# Patient Record
Sex: FEMALE | URBAN - METROPOLITAN AREA
[De-identification: names, ages, dates, MRNs, and addresses within clinical notes are randomized per-mention and may not be internally consistent; named-entity substitution may affect disease eponyms.]

---

## 2022-02-07 ENCOUNTER — TELEPHONE (OUTPATIENT)
Dept: OBGYN CLINIC | Facility: HOSPITAL | Age: 32
End: 2022-02-07

## 2023-12-22 ENCOUNTER — ANNUAL EXAM (OUTPATIENT)
Dept: OBGYN CLINIC | Facility: CLINIC | Age: 33
End: 2023-12-22
Payer: COMMERCIAL

## 2023-12-22 VITALS
SYSTOLIC BLOOD PRESSURE: 116 MMHG | DIASTOLIC BLOOD PRESSURE: 74 MMHG | BODY MASS INDEX: 31.83 KG/M2 | HEIGHT: 62 IN | WEIGHT: 173 LBS

## 2023-12-22 DIAGNOSIS — Z01.419 ENCOUNTER FOR GYNECOLOGICAL EXAMINATION WITHOUT ABNORMAL FINDING: Primary | ICD-10-CM

## 2023-12-22 DIAGNOSIS — Z31.69 PRE-CONCEPTION COUNSELING: ICD-10-CM

## 2023-12-22 PROCEDURE — S0610 ANNUAL GYNECOLOGICAL EXAMINA: HCPCS | Performed by: PHYSICIAN ASSISTANT

## 2023-12-22 RX ORDER — VENLAFAXINE 75 MG/1
TABLET ORAL
COMMUNITY
Start: 2023-12-21

## 2023-12-22 NOTE — PROGRESS NOTES
Assessment/Plan:    No problem-specific Assessment & Plan notes found for this encounter.       Diagnoses and all orders for this visit:    Encounter for gynecological examination without abnormal finding  -     IGP, Aptima HPV, Rfx 16/18,45    Pre-conception counseling  -     Ambulatory Referral to Maternal Fetal Medicine; Future    Other orders  -     Risankizumab-rzaa (SKYRIZI, 150 MG DOSE, SC)  -     venlafaxine (EFFEXOR) 75 mg tablet        Pap done.  Recommended pre-conception counseling with MFM in light of autoimmune disease and Skyrizi use; patient amenable.  Referral entered.  If no problems, patient to return in 1 year for routine gyn care.    Subjective:      Patient ID: Nicolle Feng is a 33 y.o. female.    Patient is a G0 here for yearly gyn exam.  She is new to our office today.  It has been several years since her last gyn visit.  States she is doing well overall. Periods are regular every 26-30 days, and bleeding lasts for 5-7 days.  She denies heavy bleeding, severe cramping, HA, and mood symptoms.  Patient is sexually active with her .  They would like to try to conceive in 2024.  She is currently on Skyrizi for psoriatic arthritis treatment and followed by a rheumatologist.  Also has thyroid levels followed by her PCP as her TSH has been borderline normal/high.  Denies bowel/bladder changes, pelvic pain, bloating, abdominal pain, n/v, and change in appetite.  No history of abnormal Paps or HPV.    Patient is performing self-breast exam.  Denies new masses, skin changes, nipple discharge, and pain/tenderness.  Family history of breast cancer in a MGM at age 65.        The following portions of the patient's history were reviewed and updated as appropriate: allergies, current medications, past family history, past medical history, past social history, past surgical history, and problem list.    Review of Systems   Constitutional:  Negative for appetite change and unexpected weight change.  "  Cardiovascular:         No masses, skin changes, nipple discharge, and pain/tenderness.   Gastrointestinal:  Negative for abdominal distention, abdominal pain, constipation, diarrhea, nausea and vomiting.   Genitourinary:  Negative for difficulty urinating, dysuria, frequency, genital sores, hematuria, menstrual problem, pelvic pain, urgency, vaginal bleeding, vaginal discharge and vaginal pain.         Objective:      /74 (BP Location: Left arm, Patient Position: Sitting, Cuff Size: Adult)   Ht 5' 2\" (1.575 m)   Wt 78.5 kg (173 lb)   LMP 11/27/2023   BMI 31.64 kg/m²          Physical Exam  Vitals reviewed. Exam conducted with a chaperone present.   Constitutional:       Appearance: Normal appearance. She is well-developed.   Neck:      Thyroid: No thyromegaly.   Pulmonary:      Effort: Pulmonary effort is normal.   Chest:   Breasts:     Breasts are symmetrical.      Right: Normal. No swelling, bleeding, inverted nipple, mass, nipple discharge, skin change or tenderness.      Left: Normal. No swelling, bleeding, inverted nipple, mass, nipple discharge, skin change or tenderness.   Abdominal:      General: There is no distension.      Palpations: Abdomen is soft.      Tenderness: There is no abdominal tenderness.   Genitourinary:     Pubic Area: No rash.       Labia:         Right: No rash, tenderness, lesion or injury.         Left: No rash, tenderness, lesion or injury.       Vagina: Normal. No vaginal discharge, erythema, tenderness or bleeding.      Cervix: Normal.      Uterus: Normal.       Adnexa: Right adnexa normal and left adnexa normal.        Right: No mass, tenderness or fullness.          Left: No mass, tenderness or fullness.     Musculoskeletal:      Cervical back: Neck supple.   Lymphadenopathy:      Cervical: No cervical adenopathy.      Upper Body:      Right upper body: No supraclavicular or axillary adenopathy.      Left upper body: No supraclavicular or axillary adenopathy.      Lower " Body: No right inguinal adenopathy. No left inguinal adenopathy.   Skin:     General: Skin is warm and dry.   Neurological:      Mental Status: She is alert and oriented to person, place, and time.   Psychiatric:         Behavior: Behavior normal. Behavior is cooperative.         Thought Content: Thought content normal.         Judgment: Judgment normal.

## 2023-12-29 LAB
CYTOLOGIST CVX/VAG CYTO: NORMAL
DX ICD CODE: NORMAL
HPV GENOTYPE REFLEX: NORMAL
HPV I/H RISK 4 DNA CVX QL PROBE+SIG AMP: NEGATIVE
OTHER STN SPEC: NORMAL
PATH REPORT.FINAL DX SPEC: NORMAL
SL AMB NOTE:: NORMAL
SL AMB SPECIMEN ADEQUACY: NORMAL
SL AMB TEST METHODOLOGY: NORMAL

## 2024-01-02 PROBLEM — L40.50 PSORIATIC ARTHRITIS (HCC): Status: ACTIVE | Noted: 2024-01-02

## 2024-01-03 ENCOUNTER — TELEMEDICINE (OUTPATIENT)
Dept: PERINATAL CARE | Facility: CLINIC | Age: 34
End: 2024-01-03
Payer: COMMERCIAL

## 2024-01-03 DIAGNOSIS — Z31.69 PRE-CONCEPTION COUNSELING: ICD-10-CM

## 2024-01-03 DIAGNOSIS — F32.5 MAJOR DEPRESSIVE DISORDER IN REMISSION, UNSPECIFIED WHETHER RECURRENT (HCC): ICD-10-CM

## 2024-01-03 DIAGNOSIS — Z31.430 ENCOUNTER OF FEMALE FOR TESTING FOR GENETIC DISEASE CARRIER STATUS FOR PROCREATIVE MANAGEMENT: ICD-10-CM

## 2024-01-03 DIAGNOSIS — Z83.49 FAMILY HISTORY OF THYROID PROBLEM: ICD-10-CM

## 2024-01-03 DIAGNOSIS — Z71.89 MEDICATION CARE PLAN DISCUSSED WITH PATIENT: ICD-10-CM

## 2024-01-03 DIAGNOSIS — L40.50 PSORIATIC ARTHRITIS (HCC): ICD-10-CM

## 2024-01-03 DIAGNOSIS — Z31.69 ENCOUNTER FOR PRECONCEPTION CONSULTATION: Primary | ICD-10-CM

## 2024-01-03 PROBLEM — F32.9 MAJOR DEPRESSIVE DISORDER WITH CURRENT ACTIVE EPISODE: Status: ACTIVE | Noted: 2024-01-03

## 2024-01-03 RX ORDER — PRENATAL VIT/IRON FUM/FOLIC AC 27 MG-1 MG
1 TABLET ORAL DAILY
Qty: 30 TABLET | Refills: 11 | Status: SHIPPED | OUTPATIENT
Start: 2024-01-03 | End: 2024-12-28

## 2024-01-03 NOTE — ASSESSMENT & PLAN NOTE
Stable on venlafaxine for years (SNRI).  Regarding antidepressants in pregnancy, guidelines encourage women who took effective antidepressant regimens prior to pregnancy, to continue with the same treatment regimen.   While there are some nonteratogenic effects for the  such as jitteriness and irritability, this is not specific to venlafaxine (can occur with SSRIs and SNRIs alike).  Interestingly after our visit was complete I identified an increased risk of postapartum bleeding with venlafaxine so would advise adequate IV access intrapartum, crossmatching blood when pertinent, active management of third stage of labor, and ready access to uterotonic agents; TXA is also an option.  Optimization of hemoglobin heading into delivery is also advisable and can be accomplished via oral or parenteral iron in the third trimester.  OK to continue this medication given no teratogenic effects and given favorable risk/benefit ratio.

## 2024-01-03 NOTE — PROGRESS NOTES
PRECONCEPTION CONSULTATION: MATERNAL-FETAL MEDICINE     Virtual Regular Visit    Verification of patient location: Nicolle Feng is located in the following state in which I hold an active license [place].  The patient was identified by name and date of birth. She was informed that this is a telemedicine visit and that the visit is being conducted through the Epic Embedded platform. She agrees to proceed..  My office door was closed. No one else was in the room.  She acknowledged consent and understanding of privacy and security of the video platform. The patient has agreed to participate and understands they can discontinue the visit at any time.  Patient is aware this is a billable service.     Assessment and Plan: Ms. Feng is a 33 y.o. year-old  here for preconception counseling.  By issue:    Problem List Items Addressed This Visit          Musculoskeletal and Integument    Psoriatic arthritis (HCC)     Reviewed limited data available on Skyrizi in pregnancy.  One [place] paper (PMID 65837067) which suggests, for risankizumab, contraception until at least 21 weeks after the end of treatment.  Last dose was  so this would go until 24:        From that paper, most of the antipsoriatic agents are also advised to not be taken during pregnancy, so I advised she ask her prescribing doctor (her dermatologist) if other monoclonals could be utilized.  Am available to discuss further if requested in followup.              Other    Family history of thyroid problem     Mom had Graves requiring thyroidectomy, pat GF had hypothyroidism.  Offered to check TSH and free T4.         Relevant Orders    TSH, 3rd generation    T4, free    Encounter of female for testing for genetic disease carrier status for procreative management    Relevant Orders    Cystic fibrosis gene test    SMA Carrier Screen    Hemoglobin Electrophoresis    Encounter for preconception consultation - Primary     Advised folate supplementation and  prenatal vitamin to prevent NTD.  Discussed carrier screening; offered, she accepted, so ordered.  Varicella immunity: unknown; offered testing.   BMI counseling: current BMI of 31.6..  Advised healthiest possible weight by healthiest means possible, avoiding crash/unsustainable diets.  Nutrition: advised healthy sources of protein, calcium and iron; avoid minimizing added sugar in diet (<25g/day).  Exercise: advised 150 minutes/week.  She currently does 60 minutes per week strength plus 180 minutes per week of aerobic which is excellent and can continue.  Discussed routine schedule of establishing early OB care, nuchal translucency ultrasound with aneuploidy screening if desired, detailed anatomic survey, and any additional followup.  Reviewed aneuploidy risks for current age; reviewed that aneuploidy risks increase with age and that age is a spectrum, rather than a strict cutoff, though AMA classically described as age 35.  Aspirin prophylaxis: indicated by nulliparity and BMI>30.  We will see her back once pregnant or sooner PRN.           Relevant Medications    Prenatal Vit-Fe Fumarate-FA (Prenatal/Folic Acid) TABS    Other Relevant Orders    Cystic fibrosis gene test    SMA Carrier Screen    Hemoglobin Electrophoresis    Varicella zoster antibody, IgG    Hemoglobin A1C    Major depressive disorder in remission (HCC)     Stable on venlafaxine for years (SNRI).  Regarding antidepressants in pregnancy, guidelines encourage women who took effective antidepressant regimens prior to pregnancy, to continue with the same treatment regimen.   While there are some nonteratogenic effects for the  such as jitteriness and irritability, this is not specific to venlafaxine (can occur with SSRIs and SNRIs alike).  Interestingly after our visit was complete I identified an increased risk of postapartum bleeding with venlafaxine so would advise adequate IV access intrapartum, crossmatching blood when pertinent, active  management of third stage of labor, and ready access to uterotonic agents; TXA is also an option.  Optimization of hemoglobin heading into delivery is also advisable and can be accomplished via oral or parenteral iron in the third trimester.  OK to continue this medication given no teratogenic effects and given favorable risk/benefit ratio.         RESOLVED: Medication care plan discussed with patient     Other Visit Diagnoses       BMI 31.0-31.9,adult        Relevant Orders    Hemoglobin A1C                Referring physician:   Louise Lou Pa-c  [address] [facility]  [address] [place] [id number]    Reason for consultation: No chief complaint on file.    Dear Louise,    Thank you for referring patient Nicolle Feng for preconception Maternal-Fetal Medicine consultation regarding medication exposure.  As you know, Ms. Feng is a 33 y.o. year-old .  Her history is as follows:    Past Medical History:   Diagnosis Date    Anemia     Depression     Migraine     Psoriasis     Psoriatic arthritis (HCC)        Past Surgical History:   Procedure Laterality Date    TONSILECTOMY AND ADNOIDECTOMY      age 9       OB History    Para Term  AB Living   0 0 0 0 0 0   SAB IAB Ectopic Multiple Live Births   0 0 0 0 0       Gynecologic history: Patient's last menstrual period was 2023.     Social History     Tobacco Use    Smoking status: Never    Smokeless tobacco: Never   Vaping Use    Vaping status: Never Used   Substance Use Topics    Alcohol use: Yes     Comment: Not weekly. Occasional. 2 times a month    Drug use: Never       Family History   Problem Relation Age of Onset    Thyroid disease Mother     Rashes / Skin problems Father         Psoriasis    Breast cancer Maternal Grandmother     Hypertension Maternal Grandmother     Osteoporosis Maternal Grandmother     Cancer Maternal Grandfather     Heart attack Maternal Grandfather     Osteoarthritis Maternal Grandfather      Esophageal cancer Maternal Grandfather     Rheum arthritis Maternal Grandfather     Seizures Paternal Grandmother        Family history per our office screening tool includes opioid addiction in her late father (who was killed in a home invasion related to opioids) but is negative for Ashkenazi Jainism ancestry, birth defects, blood clot in legs or lungs, diabetes, early-onset breast ovarian or colon cancer; Down syndrome or other chromosome problem, genetic condition or carrier state for cystic fibrosis, sickle cell, thalassemia, Sarthak-Sachs, or spinal muscular atrophy; hemophilia or von Willebrand's disease; and preeclampsia or hypertension.  Her  is adopted so his family history is largely unknown with the exception that his mother was a young multigravida.      Current Outpatient Medications:     Prenatal Vit-Fe Fumarate-FA (Prenatal/Folic Acid) TABS, Take 1 tablet by mouth daily, Disp: 30 tablet, Rfl: 11    Risankizumab-rzaa (SKYRIZI, 150 MG DOSE, SC), , Disp: , Rfl:     venlafaxine (EFFEXOR) 75 mg tablet, , Disp: , Rfl:     Allergies   Allergen Reactions    Sulfa Antibiotics Hives       Occupation: .  Spouse/Partner Name: Veto; Age 35; occupation: owns own business; in good health.    Vitals: Last menstrual period 11/27/2023.  Physical Exam  Constitutional:       General: She is not in acute distress.     Appearance: Normal appearance. She is not ill-appearing, toxic-appearing or diaphoretic.   HENT:      Head: Normocephalic and atraumatic.      Nose: No congestion or rhinorrhea.   Eyes:      General: No scleral icterus.        Right eye: No discharge.         Left eye: No discharge.      Extraocular Movements: Extraocular movements intact.      Conjunctiva/sclera: Conjunctivae normal.   Pulmonary:      Effort: Pulmonary effort is normal. No respiratory distress.   Musculoskeletal:      Cervical back: Normal range of motion.   Skin:     Coloration: Skin is not jaundiced or pale.       Findings: No erythema, lesion or rash.   Neurological:      General: No focal deficit present.      Mental Status: She is alert and oriented to person, place, and time.   Psychiatric:         Mood and Affect: Mood normal.         Behavior: Behavior normal.       -------------------------    The total time spent on this new patient on the encounter date was 57 minutes. This time included previsit service time of  7 minutes reviewing records and precharting, face-to-face (via virtual platform) service time of  41 minutes counseling regarding results and coordinating care, and post-service time of  10 minutes charting.    At the conclusion of today's encounter, all questions were answered to her satisfaction.  Thank you very much for this kind referral and please do not hesitate to contact me with any further questions or concerns.    Sincerely,    Marlene Mayo MD  Attending Physician, Maternal-Fetal Medicine  [facility]      VIRTUAL VISIT DISCLAIMER      Nicolle Feng verbally agrees to participate in Virtual Care Services.  Patient is aware that Virtual Care Services could be limited without vital signs or the ability to perform a full hands-on physical exam. Nicolle Feng understands she or the provider may request at any time to terminate the video visit and request the patient to seek care or treatment in person.

## 2024-01-03 NOTE — LETTER
January 3, 2024     Louise Lou PA-C  [address] [facility]  [address]  [place] [id number]    Patient: Nicolle Feng   YOB: 1990   Date of Visit: 1/3/2024       Dear Louise,    Thank you for referring Nicolle Feng to me for evaluation. Below are my notes for this consultation.    If you have questions, please do not hesitate to call me. I look forward to following your patient along with you.         Sincerely,        Marlene Mayo MD        CC: No Recipients    Marlene Mayo MD  1/3/2024  1:59 PM  Sign when Signing Visit  PRECONCEPTION CONSULTATION: MATERNAL-FETAL MEDICINE     Virtual Regular Visit    Verification of patient location: Nicolle Feng is located in the following state in which I hold an active license [place].  The patient was identified by name and date of birth. She was informed that this is a telemedicine visit and that the visit is being conducted through the Epic Embedded platform. She agrees to proceed..  My office door was closed. No one else was in the room.  She acknowledged consent and understanding of privacy and security of the video platform. The patient has agreed to participate and understands they can discontinue the visit at any time.  Patient is aware this is a billable service.     Assessment and Plan: Ms. Feng is a 33 y.o. year-old  here for preconception counseling.  By issue:    Problem List Items Addressed This Visit          Musculoskeletal and Integument    Psoriatic arthritis (HCC)     Reviewed limited data available on Skyrizi in pregnancy.  One [place] paper (PMID 59909240) which suggests, for risankizumab, contraception until at least 21 weeks after the end of treatment.  Last dose was  so this would go until 24:        From that paper, most of the antipsoriatic agents are also advised to not be taken during pregnancy, so I advised she ask her prescribing doctor (her dermatologist) if other monoclonals could be utilized.  Am available  to discuss further if requested in followup.              Other    Family history of thyroid problem     Mom had Graves requiring thyroidectomy, pat GF had hypothyroidism.  Offered to check TSH and free T4.         Relevant Orders    TSH, 3rd generation    T4, free    Encounter of female for testing for genetic disease carrier status for procreative management    Relevant Orders    Cystic fibrosis gene test    SMA Carrier Screen    Hemoglobin Electrophoresis    Encounter for preconception consultation - Primary     Advised folate supplementation and prenatal vitamin to prevent NTD.  Discussed carrier screening; offered, she accepted, so ordered.  Varicella immunity: unknown; offered testing.   BMI counseling: current BMI of 31.6..  Advised healthiest possible weight by healthiest means possible, avoiding crash/unsustainable diets.  Nutrition: advised healthy sources of protein, calcium and iron; avoid minimizing added sugar in diet (<25g/day).  Exercise: advised 150 minutes/week.  She currently does 60 minutes per week strength plus 180 minutes per week of aerobic which is excellent and can continue.  Discussed routine schedule of establishing early OB care, nuchal translucency ultrasound with aneuploidy screening if desired, detailed anatomic survey, and any additional followup.  Reviewed aneuploidy risks for current age; reviewed that aneuploidy risks increase with age and that age is a spectrum, rather than a strict cutoff, though AMA classically described as age 35.  Aspirin prophylaxis: indicated by nulliparity and BMI>30.  We will see her back once pregnant or sooner PRN.           Relevant Medications    Prenatal Vit-Fe Fumarate-FA (Prenatal/Folic Acid) TABS    Other Relevant Orders    Cystic fibrosis gene test    SMA Carrier Screen    Hemoglobin Electrophoresis    Varicella zoster antibody, IgG    Hemoglobin A1C    Major depressive disorder in remission (HCC)     Stable on venlafaxine for years  (SNRI).  Regarding antidepressants in pregnancy, guidelines encourage women who took effective antidepressant regimens prior to pregnancy, to continue with the same treatment regimen.   While there are some nonteratogenic effects for the  such as jitteriness and irritability, this is not specific to venlafaxine (can occur with SSRIs and SNRIs alike).  Interestingly after our visit was complete I identified an increased risk of postapartum bleeding with venlafaxine so would advise adequate IV access intrapartum, crossmatching blood when pertinent, active management of third stage of labor, and ready access to uterotonic agents; TXA is also an option.  Optimization of hemoglobin heading into delivery is also advisable and can be accomplished via oral or parenteral iron in the third trimester.  OK to continue this medication given no teratogenic effects and given favorable risk/benefit ratio.         RESOLVED: Medication care plan discussed with patient     Other Visit Diagnoses       BMI 31.0-31.9,adult        Relevant Orders    Hemoglobin A1C                Referring physician:   Louise Lou Pa-c  [address] [place] [id number]    Reason for consultation: No chief complaint on file.    Dear Louise,    Thank you for referring patient Nicolle Fneg for preconception Maternal-Fetal Medicine consultation regarding medication exposure.  As you know, Ms. Feng is a 33 y.o. year-old .  Her history is as follows:    Past Medical History:   Diagnosis Date   • Anemia    • Depression    • Migraine    • Psoriasis    • Psoriatic arthritis (HCC)        Past Surgical History:   Procedure Laterality Date   • TONSILECTOMY AND ADNOIDECTOMY      age 9       OB History    Para Term  AB Living   0 0 0 0 0 0   SAB IAB Ectopic Multiple Live Births   0 0 0 0 0       Gynecologic history: Patient's last menstrual period was 2023.     Social History     Tobacco Use   •  Smoking status: Never   • Smokeless tobacco: Never   Vaping Use   • Vaping status: Never Used   Substance Use Topics   • Alcohol use: Yes     Comment: Not weekly. Occasional. 2 times a month   • Drug use: Never       Family History   Problem Relation Age of Onset   • Thyroid disease Mother    • Rashes / Skin problems Father         Psoriasis   • Breast cancer Maternal Grandmother    • Hypertension Maternal Grandmother    • Osteoporosis Maternal Grandmother    • Cancer Maternal Grandfather    • Heart attack Maternal Grandfather    • Osteoarthritis Maternal Grandfather    • Esophageal cancer Maternal Grandfather    • Rheum arthritis Maternal Grandfather    • Seizures Paternal Grandmother        Family history per our office screening tool includes opioid addiction in her late father (who was killed in a home invasion related to opioids) but is negative for Ashkenazi Mosque ancestry, birth defects, blood clot in legs or lungs, diabetes, early-onset breast ovarian or colon cancer; Down syndrome or other chromosome problem, genetic condition or carrier state for cystic fibrosis, sickle cell, thalassemia, Sarthak-Sachs, or spinal muscular atrophy; hemophilia or von Willebrand's disease; and preeclampsia or hypertension.  Her  is adopted so his family history is largely unknown with the exception that his mother was a young multigravida.      Current Outpatient Medications:   •  Prenatal Vit-Fe Fumarate-FA (Prenatal/Folic Acid) TABS, Take 1 tablet by mouth daily, Disp: 30 tablet, Rfl: 11  •  Risankizumab-rzaa (SKYRIZI, 150 MG DOSE, SC), , Disp: , Rfl:   •  venlafaxine (EFFEXOR) 75 mg tablet, , Disp: , Rfl:     Allergies   Allergen Reactions   • Sulfa Antibiotics Hives       Occupation: .  Spouse/Partner Name: Veto; Age 35; occupation: owns own business; in good health.    Vitals: Last menstrual period 11/27/2023.  Physical Exam  Constitutional:       General: She is not in acute distress.      Appearance: Normal appearance. She is not ill-appearing, toxic-appearing or diaphoretic.   HENT:      Head: Normocephalic and atraumatic.      Nose: No congestion or rhinorrhea.   Eyes:      General: No scleral icterus.        Right eye: No discharge.         Left eye: No discharge.      Extraocular Movements: Extraocular movements intact.      Conjunctiva/sclera: Conjunctivae normal.   Pulmonary:      Effort: Pulmonary effort is normal. No respiratory distress.   Musculoskeletal:      Cervical back: Normal range of motion.   Skin:     Coloration: Skin is not jaundiced or pale.      Findings: No erythema, lesion or rash.   Neurological:      General: No focal deficit present.      Mental Status: She is alert and oriented to person, place, and time.   Psychiatric:         Mood and Affect: Mood normal.         Behavior: Behavior normal.       -------------------------    The total time spent on this new patient on the encounter date was 57 minutes. This time included previsit service time of  7 minutes reviewing records and precharting, face-to-face (via virtual platform) service time of  41 minutes counseling regarding results and coordinating care, and post-service time of  10 minutes charting.    At the conclusion of today's encounter, all questions were answered to her satisfaction.  Thank you very much for this kind referral and please do not hesitate to contact me with any further questions or concerns.    Sincerely,    Marlene Mayo MD  Attending Physician, Maternal-Fetal Medicine  [facility]      VIRTUAL VISIT DISCLAIMER      Nicolle Feng verbally agrees to participate in Virtual Care Services.  Patient is aware that Virtual Care Services could be limited without vital signs or the ability to perform a full hands-on physical exam. Nicolle Feng understands she or the provider may request at any time to terminate the video visit and request the patient to seek care or treatment in  person.

## 2024-01-03 NOTE — ASSESSMENT & PLAN NOTE
Advised folate supplementation and prenatal vitamin to prevent NTD.  Discussed carrier screening; offered, she accepted, so ordered.  Varicella immunity: unknown; offered testing.   BMI counseling: current BMI of 31.6..  Advised healthiest possible weight by healthiest means possible, avoiding crash/unsustainable diets.  Nutrition: advised healthy sources of protein, calcium and iron; avoid minimizing added sugar in diet (<25g/day).  Exercise: advised 150 minutes/week.  She currently does 60 minutes per week strength plus 180 minutes per week of aerobic which is excellent and can continue.  Discussed routine schedule of establishing early OB care, nuchal translucency ultrasound with aneuploidy screening if desired, detailed anatomic survey, and any additional followup.  Reviewed aneuploidy risks for current age; reviewed that aneuploidy risks increase with age and that age is a spectrum, rather than a strict cutoff, though AMA classically described as age 35.  Aspirin prophylaxis: indicated by nulliparity and BMI>30.  We will see her back once pregnant or sooner PRN.

## 2024-01-03 NOTE — ASSESSMENT & PLAN NOTE
Reviewed limited data available on Skyrizi in pregnancy.  One [place] paper (PMID [id number]) which suggests, for risankizumab, contraception until at least 21 weeks after the end of treatment.  Last dose was 11/6 so this would go until 4/1/24:        From that paper, most of the antipsoriatic agents are also advised to not be taken during pregnancy, so I advised she ask her prescribing doctor (her dermatologist) if other monoclonals could be utilized.  Am available to discuss further if requested in followup.

## 2024-01-03 NOTE — ASSESSMENT & PLAN NOTE
Mom had Graves requiring thyroidectomy, pat GF had hypothyroidism.  Offered to check TSH and free T4.

## 2024-01-04 ENCOUNTER — TELEPHONE (OUTPATIENT)
Facility: HOSPITAL | Age: 34
End: 2024-01-04

## 2024-01-04 NOTE — TELEPHONE ENCOUNTER
Received a call from Dusty at [facility] in [place] looking for clarification on prenatal vitamin with folic acid.  Dusty states they need clarification from the doctor on the brand recommended or the amount of folic acid required for the medication.  Requests a return call at [phone].

## 2024-01-05 ENCOUNTER — TELEPHONE (OUTPATIENT)
Dept: OBGYN CLINIC | Facility: CLINIC | Age: 34
End: 2024-01-05

## 2024-01-05 NOTE — TELEPHONE ENCOUNTER
Spoke with patient regarding Pap results - cytology negative, but showed presence of Candida.  Recommended OTC Monistat 7 day vaginal cream for treatment.  Call with problems.

## 2024-01-05 NOTE — TELEPHONE ENCOUNTER
Called and left VMM on pharmacy provider line to inform them that Dr Mayo requests generic prenatal vitamin to be ordered.  Instructed to contact office with further questions.

## 2024-04-05 ENCOUNTER — TELEPHONE (OUTPATIENT)
Age: 34
End: 2024-04-05

## 2024-05-14 ENCOUNTER — NURSE TRIAGE (OUTPATIENT)
Age: 34
End: 2024-05-14

## 2024-05-14 NOTE — TELEPHONE ENCOUNTER
"Pt reports that she started with nausea/vomiting intermittently the last couple of weeks. Worsened this past weekend, vomiting 4-5x a day. Has vomited x2 today so far. She is tolerating sips of water and small bland foods at this time. States she is urinating well throughout the day, last went about 30 minutes ago. She has tried promise chews and promise tea with minimal relief. Only medications she is taking are prenatal vitamins and an omega supplement. She is taking the prenatal before bed. Advised she can try vitamin B6 and unisom, start with one dose of each at bedtime and increase up to a max of 3 doses if needed. Pt aware to call back if symptoms worsen and she is not able to stay hydrated. She verbalized understanding and is thankful.    Reason for Disposition   MODERATE vomiting (e.g., 3 - 5 times / day) and present > 3 days    Answer Assessment - Initial Assessment Questions  1. VOMITING SEVERITY: \"How many times have you vomited in the past 24 hours?\"      - MILD:  1 - 2 times/day     - MODERATE: 3 - 5 times/day, decreased oral intake without significant weight loss or symptoms of dehydration     - SEVERE: 6 or more times/day, vomits everything or nearly everything, with significant weight loss, symptoms of dehydration       moderate  2. ONSET: \"When did the vomiting begin?\"       A couple of weeks ago, worsened this past weekend  3. FLUIDS: \"What fluids or food have you vomited up today?\" \"Are you able to keep any liquids down?\"      Vomited x2 today, tolerating liquids  4. TREATMENT: \"What have you been doing so far to treat this?\"       Promise chews and promise tea, helped the last couple of weeks but not anymore  5. DEHYDRATION: \"When was the last time you urinated?\" \"Are you feeling lightheaded?\" \"Weight loss?\"      Urinating well throughout the day, last went about 30 minutes ago  6. PREGNANCY: \"How many weeks pregnant are you?\" \"How has the pregnancy been going?\"      LMP 3/26/24  8. MEDICATIONS: " "\"What medications are you taking?\" (e.g., prenatal vitamins, iron)      Prenatal and omega supplement  9. OTHER SYMPTOMS: \"Do you have any other symptoms?\"      denies    Protocols used: Pregnancy - Morning Sickness (Nausea and Vomiting of Pregnancy)-ADULT-[place]    "

## 2024-05-16 ENCOUNTER — APPOINTMENT (OUTPATIENT)
Dept: URGENT CARE | Facility: CLINIC | Age: 34
End: 2024-05-16
Payer: COMMERCIAL

## 2024-05-16 ENCOUNTER — OFFICE VISIT (OUTPATIENT)
Dept: URGENT CARE | Facility: CLINIC | Age: 34
End: 2024-05-16
Payer: COMMERCIAL

## 2024-05-16 VITALS
BODY MASS INDEX: 30.18 KG/M2 | RESPIRATION RATE: 18 BRPM | DIASTOLIC BLOOD PRESSURE: 74 MMHG | HEIGHT: 62 IN | TEMPERATURE: 98.1 F | HEART RATE: 76 BPM | WEIGHT: 164 LBS | OXYGEN SATURATION: 98 % | SYSTOLIC BLOOD PRESSURE: 108 MMHG

## 2024-05-16 DIAGNOSIS — B34.9 VIRAL INFECTION: Primary | ICD-10-CM

## 2024-05-16 PROCEDURE — 99213 OFFICE O/P EST LOW 20 MIN: CPT

## 2024-05-16 NOTE — PROGRESS NOTES
[facility]        NAME: Nicolle Feng is a 33 y.o. female  : 1990    MRN: [id number]  DATE: May 16, 2024  TIME: 8:32 AM    Assessment and Plan   Viral infection [B34.9]  1. Viral infection              Patient Instructions   Take take either Allegra or Zyrtec  Regular saline spray as often as needed  Discussed with your OB/GYN about Flonase nasal spray    Follow up with PCP in 3-5 days.  Proceed to  ER if symptoms worsen.    If tests have been performed at [facility], our office will contact you with results if changes need to be made to the care plan discussed with you at the visit.  You can review your full results on [facility] MyChart.    Chief Complaint     Chief Complaint   Patient presents with    Cough     Patient is 7 weeks pregnant and states that she has been a cough since yesterday and has been congestion for the last 2 weeks. Patient states that she has been having morning sickness as well but called her OBGYN and they said to take b6 and that has helped.          History of Present Illness       This is a 33-year-old female who presents today with congestion and cough for the last 2 weeks.  Has helped.  She states the congestion and cough feels like it is moved down into her chest.  She does have seasonal allergies point patient given information on what she can and cannot take during pregnancy.  We talked about taking either Allegra or Zyrtec and saline nasal spray to help with the postnasal drip.  She is to discuss with her OB/GYN if she can take Flonase.     Cough  This is a new problem. The current episode started yesterday. The problem has been gradually worsening. The problem occurs hourly. The cough is Productive of sputum. Associated symptoms include nasal congestion, postnasal drip, rhinorrhea, a sore throat and weight loss. Pertinent negatives include no chest pain, chills, ear congestion, ear pain, fever, headaches, heartburn, hemoptysis, myalgias, rash, shortness of breath,  sweats or wheezing. The symptoms are aggravated by cold air, dust, exercise, fumes, lying down and pollens. Risk factors for lung disease include smoking/tobacco exposure.       Review of Systems   Review of Systems   Constitutional:  Positive for weight loss. Negative for chills and fever.   HENT:  Positive for postnasal drip, rhinorrhea and sore throat. Negative for ear pain.    Respiratory:  Positive for cough. Negative for hemoptysis, shortness of breath and wheezing.    Cardiovascular:  Negative for chest pain.   Gastrointestinal:  Negative for heartburn.   Musculoskeletal:  Negative for myalgias.   Skin:  Negative for rash.   Neurological:  Negative for headaches.         Current Medications       Current Outpatient Medications:     Prenatal Vit-Fe Fumarate-FA (Prenatal/Folic Acid) TABS, Take 1 tablet by mouth daily, Disp: 30 tablet, Rfl: 11    Risankizumab-rzaa (SKYRIZI, 150 MG DOSE, SC), , Disp: , Rfl:     venlafaxine (EFFEXOR) 75 mg tablet, , Disp: , Rfl:     Current Allergies     Allergies as of 05/16/2024 - Reviewed 05/16/2024   Allergen Reaction Noted    Sulfa antibiotics Hives 12/22/2023            The following portions of the patient's history were reviewed and updated as appropriate: allergies, current medications, past family history, past medical history, past social history, past surgical history and problem list.     Past Medical History:   Diagnosis Date    Anemia 1998    Depression 2012    Migraine 2012    Psoriasis     Psoriatic arthritis (HCC)        Past Surgical History:   Procedure Laterality Date    TONSILECTOMY AND ADNOIDECTOMY      age 9       Family History   Problem Relation Age of Onset    Thyroid disease Mother     Rashes / Skin problems Father         Psoriasis    Breast cancer Maternal Grandmother     Hypertension Maternal Grandmother     Osteoporosis Maternal Grandmother     Cancer Maternal Grandfather     Heart attack Maternal Grandfather     Osteoarthritis Maternal Grandfather   "   Esophageal cancer Maternal Grandfather     Rheum arthritis Maternal Grandfather     Seizures Paternal Grandmother          Medications have been verified.        Objective   /74   Pulse 76   Temp 98.1 °F (36.7 °C) (Tympanic)   Resp 18   Ht 5' 2\" (1.575 m)   Wt 74.4 kg (164 lb)   LMP 03/26/2024 (Approximate)   SpO2 98%   BMI 30.00 kg/m²   Patient's last menstrual period was 03/26/2024 (approximate).       Physical Exam     Physical Exam  Constitutional:       Appearance: She is normal weight.   HENT:      Head: Normocephalic and atraumatic.      Right Ear: Tympanic membrane, ear canal and external ear normal.      Left Ear: Tympanic membrane, ear canal and external ear normal.      Nose: Congestion present.      Mouth/Throat:      Mouth: Mucous membranes are moist.      Pharynx: Oropharynx is clear. No posterior oropharyngeal erythema.   Eyes:      Pupils: Pupils are equal, round, and reactive to light.   Cardiovascular:      Rate and Rhythm: Normal rate and regular rhythm.      Pulses: Normal pulses.      Heart sounds: Normal heart sounds.   Pulmonary:      Effort: Pulmonary effort is normal.      Breath sounds: Normal breath sounds. No wheezing or rhonchi.   Abdominal:      General: Abdomen is flat. Bowel sounds are normal.   Musculoskeletal:         General: Normal range of motion.      Cervical back: Normal range of motion and neck supple.   Skin:     General: Skin is warm and dry.      Capillary Refill: Capillary refill takes less than 2 seconds.   Neurological:      Mental Status: She is alert.                   "

## 2024-05-16 NOTE — PATIENT INSTRUCTIONS
Take take either Allegra or Zyrtec  Regular saline spray as often as needed  Discussed with your OB/GYN about Flonase nasal spray

## 2024-05-20 ENCOUNTER — TELEPHONE (OUTPATIENT)
Dept: OBGYN CLINIC | Facility: CLINIC | Age: 34
End: 2024-05-20

## 2024-05-20 NOTE — TELEPHONE ENCOUNTER
Placed call to patient to f/u on n/v and review symptoms, left a non detailed message to return our call.

## 2024-05-21 ENCOUNTER — TELEPHONE (OUTPATIENT)
Age: 34
End: 2024-05-21

## 2024-05-22 ENCOUNTER — TELEPHONE (OUTPATIENT)
Dept: OBGYN CLINIC | Facility: CLINIC | Age: 34
End: 2024-05-22

## 2024-05-22 NOTE — TELEPHONE ENCOUNTER
Spoke with patient re: work excuse for 5/15/2024, 5/16/2024, 5/17/2024 sent to patient via [employer].  Patient is talking B6 TID, tolerating small frequent meals, bland diet.  Recommended to increase hydration.  Has D&V US scheduled for 5/24/2024.

## 2024-05-23 NOTE — PROGRESS NOTES
"S: 33 y.o.  who presents for viability scan with LMP of 24. She is 8 weeks and 3 days by her LMP. SABRINA: 24. She reports nausea/vomiting. She denies cramping or vaginal bleeding. This is  a planned and welcomed pregnancy.     Past Medical History:   Diagnosis Date    Anemia     Depression     Migraine     Psoriasis     Psoriatic arthritis (HCC)        OB History    Para Term  AB Living   1 0 0 0 0 0   SAB IAB Ectopic Multiple Live Births   0 0 0 0 0      # Outcome Date GA Lbr Casey/2nd Weight Sex Type Anes PTL Lv   1 Current                 O:  Vitals:    24 1450   BP: 110/64   BP Location: Left arm   Patient Position: Sitting   Cuff Size: Standard   Weight: 73.5 kg (162 lb)   Height: 5' 2\" (1.575 m)       TVUS: TVUS indicates viable, mchugh IUP measuring 18 mm, correlating with 8w2d gestation. SABRINA based off LMP: 24. FHT: 162    A/P:  1. Viable pregnancy on TVUS  2. MFM referral placed.  3. RTC in 2 week for nurse intake visit    Problem List Items Addressed This Visit    None  Visit Diagnoses       Amenorrhea    -  Primary    Nausea and vomiting in pregnancy        Relevant Medications    ondansetron (ZOFRAN) 4 mg tablet    Early stage of pregnancy        Relevant Orders    Ambulatory Referral to Maternal Fetal Medicine            "

## 2024-05-24 ENCOUNTER — ULTRASOUND (OUTPATIENT)
Dept: OBGYN CLINIC | Facility: CLINIC | Age: 34
End: 2024-05-24
Payer: COMMERCIAL

## 2024-05-24 VITALS
SYSTOLIC BLOOD PRESSURE: 110 MMHG | HEIGHT: 62 IN | BODY MASS INDEX: 29.81 KG/M2 | DIASTOLIC BLOOD PRESSURE: 64 MMHG | WEIGHT: 162 LBS

## 2024-05-24 DIAGNOSIS — Z34.90 EARLY STAGE OF PREGNANCY: ICD-10-CM

## 2024-05-24 DIAGNOSIS — N91.2 AMENORRHEA: Primary | ICD-10-CM

## 2024-05-24 DIAGNOSIS — O21.9 NAUSEA AND VOMITING IN PREGNANCY: ICD-10-CM

## 2024-05-24 PROCEDURE — 76817 TRANSVAGINAL US OBSTETRIC: CPT

## 2024-05-24 PROCEDURE — 99214 OFFICE O/P EST MOD 30 MIN: CPT

## 2024-05-24 RX ORDER — DIPHENHYDRAMINE HYDROCHLORIDE 25 MG/1
CAPSULE ORAL
COMMUNITY

## 2024-05-24 RX ORDER — ONDANSETRON 4 MG/1
4 TABLET, FILM COATED ORAL EVERY 8 HOURS PRN
Qty: 20 TABLET | Refills: 1 | Status: SHIPPED | OUTPATIENT
Start: 2024-05-24

## 2024-05-24 NOTE — PATIENT INSTRUCTIONS
Nausea and vomiting:  Unisom 25mg at night, can go up to 50 mg  Vitamin B6     At Checkout, make an appointment for OB Nurse Intake and Education in 2 weeks.  Please schedule future prenatal visits at checkout or by calling the [place] office at [phone]. Next appointment with a provider is in 4 weeks and will be a physical exam.   Call Maternal fetal medicine to establish care.    [email or web address]

## 2024-05-28 ENCOUNTER — TELEPHONE (OUTPATIENT)
Age: 34
End: 2024-05-28

## 2024-05-30 ENCOUNTER — TELEPHONE (OUTPATIENT)
Dept: PERINATAL CARE | Facility: OTHER | Age: 34
End: 2024-05-30

## 2024-05-30 NOTE — TELEPHONE ENCOUNTER
Called patient to schedule MFM appointment, based on referral issued to Maternal Fetal Medicine by OB office.    Left voicemail requesting patient to call back and schedule appointment, with office number for return call [phone].

## 2024-06-06 ENCOUNTER — TELEPHONE (OUTPATIENT)
Age: 34
End: 2024-06-06

## 2024-06-06 NOTE — TELEPHONE ENCOUNTER
Patient called the RX Refill Line. Message is being forwarded to the office.     Patient is requesting - Pt called refill line requesting refill for ondansetron. Pt informed me she recently picked up her refill but is only for a 6 day supply. Pt will be going away for 1 week this upcoming Sunday and she is requesting the doctor if appropriate to please send new Rx with enough medication for the days she's going to be away. Pt states she still having morning sickness and is taking 1 tab in the AM and 1 tab in the PM. Please review and reach out to Pt with updates as soon as possible Thank you.    Please contact patient at [phone]

## 2024-06-07 ENCOUNTER — INITIAL PRENATAL (OUTPATIENT)
Dept: OBGYN CLINIC | Facility: CLINIC | Age: 34
End: 2024-06-07

## 2024-06-07 VITALS
HEIGHT: 62 IN | BODY MASS INDEX: 29.66 KG/M2 | SYSTOLIC BLOOD PRESSURE: 108 MMHG | DIASTOLIC BLOOD PRESSURE: 60 MMHG | WEIGHT: 161.2 LBS

## 2024-06-07 DIAGNOSIS — E03.9 BORDERLINE HYPOTHYROIDISM: ICD-10-CM

## 2024-06-07 DIAGNOSIS — D56.9 THALASSEMIA, UNSPECIFIED TYPE: ICD-10-CM

## 2024-06-07 DIAGNOSIS — Z36.89 ENCOUNTER FOR OTHER SPECIFIED ANTENATAL SCREENING: Primary | ICD-10-CM

## 2024-06-07 DIAGNOSIS — O21.9 NAUSEA AND VOMITING IN PREGNANCY: ICD-10-CM

## 2024-06-07 DIAGNOSIS — Z31.430 ENCOUNTER OF FEMALE FOR TESTING FOR GENETIC DISEASE CARRIER STATUS FOR PROCREATIVE MANAGEMENT: ICD-10-CM

## 2024-06-07 DIAGNOSIS — Z34.01 ENCOUNTER FOR SUPERVISION OF NORMAL FIRST PREGNANCY IN FIRST TRIMESTER: ICD-10-CM

## 2024-06-07 PROCEDURE — OBC

## 2024-06-07 RX ORDER — ONDANSETRON 4 MG/1
4 TABLET, FILM COATED ORAL EVERY 8 HOURS PRN
Qty: 30 TABLET | Refills: 1 | Status: SHIPPED | OUTPATIENT
Start: 2024-06-07

## 2024-06-07 NOTE — PATIENT INSTRUCTIONS
Congratulations!! Please review our Pregnancy Essential Guide and [facility] L&D Virtual tour from our networks website.     [facility] Pregnancy Essentials Guide  [facility] ([email or web address])     [facility] - Virtual Tour ([facility])        [facility]     It was so nice getting to know you today. Remember if you have an urgent or time sensitive concern, please call the practice phone number so a clinical triage team member can review your symptoms and get in touch with our on call provider if necessary. If you have general questions or need help navigating our services please REPLY to this message so it comes directly to me and I will respond between other patients. If I am out of the office for any reason, another nurse navigator may reach out to help you.     Again, Congratulations and Thank You for choosing [facility]. I look forward to helping you through this journey.

## 2024-06-07 NOTE — PROGRESS NOTES
OB INTAKE INTERVIEW  Patient is 33 y.o. who presents for OB intake at 10 wks 3 days  She is accompanied by her  during this encounter  The father of her baby (Veto) is involved in the pregnancy and is 35 years old  Unknown FOB family hx (adopted).      Last Menstrual Period: 3/26/2024  Ultrasound: Measured 8 weeks 2 days on 2024  Estimated Date of Delivery: 2024 confirmed by US    Signs/Symptoms of Pregnancy  Current pregnancy symptoms: nausea, vomiting (taking Zofran BID 4 mg), breast tenderness, urinary frequency  Reviewed dietary recommendations in pregnancy  Constipation YES - taking fiber supp, recommended Miralx, Colace prn  Headaches no (earlier in pregnancy, felt work related)  Cramping/spotting YES (earlier in preg)  PICA cravings no    Diabetes-  There is no height or weight on file to calculate BMI.  If patient has 1 or more, please order early 1 hour GTT  History of GDM no  BMI >35 no  History of PCOS or current metformin use no  History of LGA/macrosomic infant (4000g/9lbs) no    If patient has 2 or more, please order early 1 hour GTT  BMI>30 no  AMA no  First degree relative with type 2 diabetes no  History of chronic HTN, hyperlipidemia, elevated A1C no  High risk race (, , ,  or ) no    Hypertension- if you answer yes to any of the following, please order baseline preeclampsia labs (cbc, comprehensive metabolic panel, urine protein creatinine ratio, uric acid)  History of of chronic HTN no  History of gestational HTN no  History of preeclampsia, eclampsia, or HELLP syndrome no  History of diabetes no  History of lupus, autoimmune disease, kidney disease no    Thyroid- if yes order TSH with reflex T4  History of thyroid disease no (borderline) - last checked     Bleeding Disorder or Hx of DVT-patient or first degree relative with history of. Order the following if not done previously.   (Factor V, antithrombin III,  prothrombin gene mutation, protein C and S Ag, lupus anticoagulant, anticardiolipin, beta-2 glycoprotein)   no    OB/GYN-  History of abnormal pap smear no       Date of last pap smear 2023  History of HPV no  History of Herpes/HSV no  History of other STI (gonorrhea, chlamydia, trich) no  History of prior  no  History of prior  no  History of  delivery prior to 36 weeks 6 days no  History of blood transfusion no  Ok for blood transfusion YES    Substance screening-   History of tobacco use no  Currently using tobacco no  Substance Use Screen Level (N/A, LOW, HIGH) no    MRSA Screening-   Does the pt have a hx of MRSA? no    Immunizations:  Influenza vaccine given this season NO (no vaccines with Skyrizi use)  Discussed Tdap vaccine YES  Discussed COVID Vaccine - patient had Covid x 1 (J&J), patient had Covid (, , )    Genetic/MFM-  Do you or your partner have a history of any of the following in yourselves or first degree relatives?  Cystic fibrosis no  Spinal muscular atrophy no  Hemoglobinopathy/Sickle Cell/Thalassemia no  Fragile X Intellectual Disability no    If yes, discuss Carrier Screening and recommend consultation with MFM/Genetic Counseling and place specific [facility] Referral for.    If no, discuss Carrier Screening being completed once in a lifetime as a standard of care lab test. Place orders for Cystic Fibrosis Gene Test (UDU850) and Spinal Muscular Atrophy DNA ([id number]) ordered      Appointment for Nuchal Translucency Ultrasound at [facility] scheduled for 2024 - reviewed SQS/NIPT      Interview education  [facility] Pregnancy Essentials Book reviewed, discussed and attached to their AVS YES    Nurse/Family Partnership - referral placed NO    Prenatal lab work scripts YES  Extra labs ordered:  Hgb fractionation cascade    Aspirin/Preeclampsia Screen    Risk Level Risk Factor Recommendation   LOW Prior Uncomplicated full-term delivery no No Aspirin recommendation         MODERATE Nulliparity YES Recommend low-dose aspirin if     BMI>30 no 2 or more moderate risk factors    Family History Preeclampsia (mother/sister) no     35yr old or greater no     Black Race, Concern for SDOH/Low Socioeconomic no     IVF Pregnancy  no     Personal History Risks (low birth weight, prior adverse preg outcome, >10yr preg interval) no         HIGH History of Preeclampsia no Recommend low-dose aspirin if     Multifetal gestation no 1 or more high risk factors    Chronic HTN no     Type 1 or 2 Diabetes no     Renal Disease no     Autoimmune Disease  no      Contraindications to ASA therapy:  NSAID/ ASA allergy: no  Nasal polyps: no  Asthma with history of ASA induced bronchospasm: no  Relative contraindications:  History of GI bleed: no  Active peptic ulcer disease: no  Severe hepatic dysfunction: no    Patient should be recommended to take ASA 162mg during this pregnancy from 12-36wks to lower her risk of preeclampsia: will follow up with OB provider next appt      The patient has a history now or in prior pregnancy notable for:  - hx psoriatic arthritis (Skyrizi) - follows with rheumatology  - hx borderline high TSH - ordered TSH,T4 free with initial prenatal labs  - SULFA allergy  - hx depression/anxiety (related to PTDS) - previously on Effexor last (d/c 12/2023) - doing well presently (EPDS today = 7)        Details that I feel the provider should be aware of: see above  - plans travel to [place] 6/9 - 6/16/2024  - plans travel to [facility] 7/1 - 7/21/2024 (work related)      PN1 visit scheduled. The patient was oriented to our practice, the navigator role, reviewed delivering physicians and [facility] for Delivery. All questions were answered.    Interviewed by: CATHERINE Armenta RN

## 2024-06-13 NOTE — PROGRESS NOTES
OB/GYN  PN Visit  Nicolle Feng  [id number]  2024  8:51 AM  YOSELIN Valdes      S: 33 y.o.  12w0d here for PN visit.     OB Complaints:  Denies c/o n/v/ha, no edema, no smoking, no DV.   No vb/lof  No cramping/ctxns or signs of PTL.    She reports nausea/vomiting; utilizes zofran PRN.  Established care with MFM.  Appt .    O:    Pre- Vitals      Flowsheet Row Most Recent Value   Prenatal Assessment    Fetal Heart Rate 158   Prenatal Vitals    Blood Pressure 108/62   Weight - Scale 71.7 kg (158 lb)   Urine Albumin/Glucose    Dilation/Effacement/Station    Vaginal Drainage    Edema               Gen: no acute distress, nonlabored breathing.  OB exam completed:+FHT  Urine: -/-    A/P:  Problem List Items Addressed This Visit    None  Visit Diagnoses       Prenatal care in second trimester    -  Primary    Relevant Orders    Chlamydia/GC amplified DNA by PCR          #1. 12w0d GESTATION  Physical exam and cultures done today. Last pap: . Pap not done today per ASCCP guidelines.       RTC in 4 weeks

## 2024-06-17 ENCOUNTER — APPOINTMENT (OUTPATIENT)
Dept: LAB | Facility: CLINIC | Age: 34
End: 2024-06-17
Payer: COMMERCIAL

## 2024-06-17 DIAGNOSIS — E03.9 BORDERLINE HYPOTHYROIDISM: ICD-10-CM

## 2024-06-17 DIAGNOSIS — Z34.01 ENCOUNTER FOR SUPERVISION OF NORMAL FIRST PREGNANCY IN FIRST TRIMESTER: ICD-10-CM

## 2024-06-17 LAB
ABO GROUP BLD: NORMAL
BASOPHILS # BLD AUTO: 0.03 THOUSANDS/ÂΜL (ref 0–0.1)
BASOPHILS NFR BLD AUTO: 0 % (ref 0–1)
BILIRUB UR QL STRIP: NEGATIVE
BLD GP AB SCN SERPL QL: NEGATIVE
CLARITY UR: CLEAR
COLOR UR: NORMAL
EOSINOPHIL # BLD AUTO: 0.05 THOUSAND/ÂΜL (ref 0–0.61)
EOSINOPHIL NFR BLD AUTO: 1 % (ref 0–6)
ERYTHROCYTE [DISTWIDTH] IN BLOOD BY AUTOMATED COUNT: 13.8 % (ref 11.6–15.1)
GLUCOSE UR STRIP-MCNC: NEGATIVE MG/DL
HCT VFR BLD AUTO: 37.4 % (ref 34.8–46.1)
HGB BLD-MCNC: 11.8 G/DL (ref 11.5–15.4)
HGB UR QL STRIP.AUTO: NEGATIVE
IMM GRANULOCYTES # BLD AUTO: 0.03 THOUSAND/UL (ref 0–0.2)
IMM GRANULOCYTES NFR BLD AUTO: 0 % (ref 0–2)
KETONES UR STRIP-MCNC: NEGATIVE MG/DL
LEUKOCYTE ESTERASE UR QL STRIP: NEGATIVE
LYMPHOCYTES # BLD AUTO: 1.57 THOUSANDS/ÂΜL (ref 0.6–4.47)
LYMPHOCYTES NFR BLD AUTO: 18 % (ref 14–44)
MCH RBC QN AUTO: 29.3 PG (ref 26.8–34.3)
MCHC RBC AUTO-ENTMCNC: 31.6 G/DL (ref 31.4–37.4)
MCV RBC AUTO: 93 FL (ref 82–98)
MONOCYTES # BLD AUTO: 0.52 THOUSAND/ÂΜL (ref 0.17–1.22)
MONOCYTES NFR BLD AUTO: 6 % (ref 4–12)
NEUTROPHILS # BLD AUTO: 6.34 THOUSANDS/ÂΜL (ref 1.85–7.62)
NEUTS SEG NFR BLD AUTO: 75 % (ref 43–75)
NITRITE UR QL STRIP: NEGATIVE
NRBC BLD AUTO-RTO: 0 /100 WBCS
PH UR STRIP.AUTO: 6.5 [PH]
PLATELET # BLD AUTO: 274 THOUSANDS/UL (ref 149–390)
PMV BLD AUTO: 10.9 FL (ref 8.9–12.7)
PROT UR STRIP-MCNC: NEGATIVE MG/DL
RBC # BLD AUTO: 4.03 MILLION/UL (ref 3.81–5.12)
RH BLD: POSITIVE
RUBV IGG SERPL IA-ACNC: 22.1 IU/ML
SP GR UR STRIP.AUTO: 1.01 (ref 1–1.03)
SPECIMEN EXPIRATION DATE: NORMAL
T4 FREE SERPL-MCNC: 0.85 NG/DL (ref 0.61–1.12)
TSH SERPL DL<=0.05 MIU/L-ACNC: 1.67 UIU/ML (ref 0.45–4.5)
UROBILINOGEN UR STRIP-ACNC: <2 MG/DL
WBC # BLD AUTO: 8.54 THOUSAND/UL (ref 4.31–10.16)

## 2024-06-17 PROCEDURE — 86780 TREPONEMA PALLIDUM: CPT

## 2024-06-17 PROCEDURE — 36415 COLL VENOUS BLD VENIPUNCTURE: CPT

## 2024-06-17 PROCEDURE — 83020 HEMOGLOBIN ELECTROPHORESIS: CPT

## 2024-06-17 PROCEDURE — 86901 BLOOD TYPING SEROLOGIC RH(D): CPT

## 2024-06-17 PROCEDURE — 86900 BLOOD TYPING SEROLOGIC ABO: CPT

## 2024-06-17 PROCEDURE — 84443 ASSAY THYROID STIM HORMONE: CPT

## 2024-06-17 PROCEDURE — 87389 HIV-1 AG W/HIV-1&-2 AB AG IA: CPT

## 2024-06-17 PROCEDURE — 87086 URINE CULTURE/COLONY COUNT: CPT

## 2024-06-17 PROCEDURE — 81003 URINALYSIS AUTO W/O SCOPE: CPT

## 2024-06-17 PROCEDURE — 84439 ASSAY OF FREE THYROXINE: CPT

## 2024-06-17 PROCEDURE — 86762 RUBELLA ANTIBODY: CPT

## 2024-06-17 PROCEDURE — 85025 COMPLETE CBC W/AUTO DIFF WBC: CPT

## 2024-06-17 PROCEDURE — 86850 RBC ANTIBODY SCREEN: CPT

## 2024-06-17 PROCEDURE — 86803 HEPATITIS C AB TEST: CPT

## 2024-06-17 PROCEDURE — 87340 HEPATITIS B SURFACE AG IA: CPT

## 2024-06-18 ENCOUNTER — INITIAL PRENATAL (OUTPATIENT)
Dept: OBGYN CLINIC | Facility: CLINIC | Age: 34
End: 2024-06-18

## 2024-06-18 VITALS
WEIGHT: 158 LBS | SYSTOLIC BLOOD PRESSURE: 108 MMHG | HEIGHT: 62 IN | DIASTOLIC BLOOD PRESSURE: 62 MMHG | BODY MASS INDEX: 29.08 KG/M2

## 2024-06-18 DIAGNOSIS — Z34.92 PRENATAL CARE IN SECOND TRIMESTER: Primary | ICD-10-CM

## 2024-06-18 LAB
HBV SURFACE AG SER QL: NORMAL
HCV AB SER QL: NORMAL
HIV 1+2 AB+HIV1 P24 AG SERPL QL IA: NORMAL
HIV 2 AB SERPL QL IA: NORMAL
HIV1 AB SERPL QL IA: NORMAL
HIV1 P24 AG SERPL QL IA: NORMAL
TREPONEMA PALLIDUM IGG+IGM AB [PRESENCE] IN SERUM OR PLASMA BY IMMUNOASSAY: NORMAL

## 2024-06-18 PROCEDURE — PNV

## 2024-06-18 PROCEDURE — 87591 N.GONORRHOEAE DNA AMP PROB: CPT

## 2024-06-18 PROCEDURE — 87491 CHLMYD TRACH DNA AMP PROBE: CPT

## 2024-06-19 LAB
BACTERIA UR CULT: NORMAL
C TRACH DNA SPEC QL NAA+PROBE: NEGATIVE
N GONORRHOEA DNA SPEC QL NAA+PROBE: NEGATIVE

## 2024-06-20 ENCOUNTER — ROUTINE PRENATAL (OUTPATIENT)
Facility: HOSPITAL | Age: 34
End: 2024-06-20
Payer: COMMERCIAL

## 2024-06-20 VITALS
BODY MASS INDEX: 29.3 KG/M2 | HEART RATE: 82 BPM | DIASTOLIC BLOOD PRESSURE: 60 MMHG | SYSTOLIC BLOOD PRESSURE: 110 MMHG | WEIGHT: 159.2 LBS | HEIGHT: 62 IN

## 2024-06-20 DIAGNOSIS — Z36.82 ENCOUNTER FOR NUCHAL TRANSLUCENCY TESTING: ICD-10-CM

## 2024-06-20 DIAGNOSIS — Z3A.12 12 WEEKS GESTATION OF PREGNANCY: ICD-10-CM

## 2024-06-20 DIAGNOSIS — Z34.90 EARLY STAGE OF PREGNANCY: ICD-10-CM

## 2024-06-20 DIAGNOSIS — O36.80X0 ENCOUNTER TO DETERMINE FETAL VIABILITY OF PREGNANCY, SINGLE OR UNSPECIFIED FETUS: Primary | ICD-10-CM

## 2024-06-20 PROCEDURE — 99203 OFFICE O/P NEW LOW 30 MIN: CPT | Performed by: OBSTETRICS & GYNECOLOGY

## 2024-06-20 PROCEDURE — 76801 OB US < 14 WKS SINGLE FETUS: CPT | Performed by: OBSTETRICS & GYNECOLOGY

## 2024-06-20 PROCEDURE — 76813 OB US NUCHAL MEAS 1 GEST: CPT | Performed by: OBSTETRICS & GYNECOLOGY

## 2024-06-20 NOTE — PROGRESS NOTES
"Mildly presents today for a genetic screening ultrasound.  This is her first pregnancy.  She has no significant  surgical, substance use, or family history.  She has a history of psoriatic arthritis.  She has a history of depression.  She no longer takes medication for either of these conditions.  A review of systems is otherwise negative.    We discussed the options for genetic screening.  At the conclusion of our discussion the patient declined maternal serum screening to delineate her risk for fetal aneuploidy.    We discussed follow-up in detail and I recommend an anatomy ultrasound be scheduled for 20 weeks gestation.    Thank you very much for allowing us to participate in the care of this very nice patient. Should you have any questions, please do not hesitate to contact our office.    Portions of the record may have been created with voice recognition software. Occasional wrong word or \"sound a like\" substitutions may have occurred due to the inherent limitations of voice recognition software. Read the chart carefully and recognize, using context, where substitutions have occurred.  "

## 2024-06-22 LAB
HGB A MFR BLD: 2.3 % (ref 1.8–3.2)
HGB A MFR BLD: 97.7 % (ref 96.4–98.8)
HGB F MFR BLD: 0 % (ref 0–2)
HGB FRACT BLD-IMP: NORMAL
HGB S MFR BLD: 0 %

## 2024-07-24 ENCOUNTER — ROUTINE PRENATAL (OUTPATIENT)
Dept: OBGYN CLINIC | Facility: CLINIC | Age: 34
End: 2024-07-24

## 2024-07-24 VITALS — BODY MASS INDEX: 29.08 KG/M2 | SYSTOLIC BLOOD PRESSURE: 112 MMHG | WEIGHT: 159 LBS | DIASTOLIC BLOOD PRESSURE: 68 MMHG

## 2024-07-24 DIAGNOSIS — Z34.92 PRENATAL CARE IN SECOND TRIMESTER: Primary | ICD-10-CM

## 2024-07-24 PROCEDURE — PNV: Performed by: STUDENT IN AN ORGANIZED HEALTH CARE EDUCATION/TRAINING PROGRAM

## 2024-07-24 NOTE — PROGRESS NOTES
Nicolle is a 33-year-old G1, P0 at 17 weeks and 1 day presenting for routine prenatal care-she reports occasional slight cramping denies any loss of fluid or vaginal bleeding.  Urine negative/negative.  Pregnancy complicated by history of psoriatic arthritis and history of depression.  Prenatal labs reviewed and discussed carrier screening was not completed if desired.  Reviewed recommendations for MSAFP screening which was ordered for today.  First trimester ultrasound was within normal limits and has level 2 ultrasound scheduled.  Return to office in 4 weeks or sooner if needed.

## 2024-08-14 ENCOUNTER — ROUTINE PRENATAL (OUTPATIENT)
Facility: HOSPITAL | Age: 34
End: 2024-08-14
Payer: COMMERCIAL

## 2024-08-14 VITALS
HEART RATE: 79 BPM | WEIGHT: 161 LBS | HEIGHT: 62 IN | BODY MASS INDEX: 29.63 KG/M2 | DIASTOLIC BLOOD PRESSURE: 60 MMHG | SYSTOLIC BLOOD PRESSURE: 108 MMHG

## 2024-08-14 DIAGNOSIS — O99.891 MATERNAL ARTHRITIS COMPLICATING PREGNANCY: Primary | ICD-10-CM

## 2024-08-14 DIAGNOSIS — M19.90 MATERNAL ARTHRITIS COMPLICATING PREGNANCY: Primary | ICD-10-CM

## 2024-08-14 DIAGNOSIS — Z36.86 ENCOUNTER FOR ANTENATAL SCREENING FOR CERVICAL LENGTH: ICD-10-CM

## 2024-08-14 DIAGNOSIS — F32.A DEPRESSION COMPLICATING PREGNANCY, ANTEPARTUM, SECOND TRIMESTER: ICD-10-CM

## 2024-08-14 DIAGNOSIS — O99.342 ANXIETY DURING PREGNANCY, ANTEPARTUM, SECOND TRIMESTER: ICD-10-CM

## 2024-08-14 DIAGNOSIS — L40.50 PSORIATIC ARTHRITIS (HCC): ICD-10-CM

## 2024-08-14 DIAGNOSIS — O99.342 DEPRESSION COMPLICATING PREGNANCY, ANTEPARTUM, SECOND TRIMESTER: ICD-10-CM

## 2024-08-14 DIAGNOSIS — F41.9 ANXIETY DURING PREGNANCY, ANTEPARTUM, SECOND TRIMESTER: ICD-10-CM

## 2024-08-14 DIAGNOSIS — Z3A.20 20 WEEKS GESTATION OF PREGNANCY: ICD-10-CM

## 2024-08-14 DIAGNOSIS — Z36.3 ENCOUNTER FOR ANTENATAL SCREENING FOR MALFORMATION: ICD-10-CM

## 2024-08-14 PROCEDURE — 76817 TRANSVAGINAL US OBSTETRIC: CPT | Performed by: OBSTETRICS & GYNECOLOGY

## 2024-08-14 PROCEDURE — 76805 OB US >/= 14 WKS SNGL FETUS: CPT | Performed by: OBSTETRICS & GYNECOLOGY

## 2024-08-14 NOTE — PROGRESS NOTES
Patient was moved to Dr. Fernando Berry's schedule on this day.  Please see his note separately.    Sharmin Ram MD

## 2024-08-14 NOTE — PROGRESS NOTES
Ultrasound Probe Disinfection    A transvaginal ultrasound was performed.   Prior to use, disinfection was performed with High Level Disinfection Process ([employer]).  Probe serial number [id number]: [id number] was used.    Radhika Phillips  08/14/24  8:13 AM

## 2024-08-14 NOTE — LETTER
August 17, 2024     Taylor Scott MD  [address] [facility] [address]    Patient: Nicolle Fegn   YOB: 1990   Date of Visit: 8/14/2024       Dear Dr. Scott:    Thank you for referring Nicolle Feng to me for evaluation. Below are my notes for this consultation.    If you have questions, please do not hesitate to call me. I look forward to following your patient along with you.         Sincerely,        Fernando Berry MD        CC: No Recipients

## 2024-08-16 ENCOUNTER — TELEPHONE (OUTPATIENT)
Age: 34
End: 2024-08-16

## 2024-08-16 NOTE — TELEPHONE ENCOUNTER
Pt called looking to R/S upcoming 20 wk OB appt scheduled for 8/20. Pt is okay to be seen at [facility] or [facility]. Is requesting an early appt before 8am, or a later appt after 3:00.    Please F/U to assist with R/S appt. Thank you.

## 2024-08-19 NOTE — TELEPHONE ENCOUNTER
LMOM asked pt to verify cancellation and let them know we are looking for an future opening in the schedule

## 2024-08-21 ENCOUNTER — PATIENT MESSAGE (OUTPATIENT)
Facility: HOSPITAL | Age: 34
End: 2024-08-21

## 2024-08-23 NOTE — PATIENT COMMUNICATION
Left voicemail encouraging patient to call [facility] rheumatology at [phone] per Dr. Berry's recommendations.

## 2024-08-26 ENCOUNTER — TELEPHONE (OUTPATIENT)
Dept: OBGYN CLINIC | Facility: CLINIC | Age: 34
End: 2024-08-26

## 2024-08-26 NOTE — PROGRESS NOTES
OB/GYN  PN Visit  Nicolle Feng  [id number]  2024  7:30 AM  YOSELIN Valdes      S: 33 y.o.  22w0d here for PN visit. Pregnancy complicated by psoriatic arthritis.     OB Complaints:  Denies c/o  no edema, no smoking, no DV.   No vb/lof  No cramping/ctxns or signs of PTL.    She reports nausea and headaches; mild.     O:    Pre-Kathrine Vitals      Flowsheet Row Most Recent Value   Prenatal Assessment    Fetal Heart Rate 155   Prenatal Vitals    Blood Pressure 110/62   Weight - Scale 73.5 kg (162 lb)   Urine Albumin/Glucose    Dilation/Effacement/Station    Vaginal Drainage    Edema                 Gen: no acute distress, nonlabored breathing.  OB exam completed:+FHT  Urine: -/-    A/P:  Problem List Items Addressed This Visit    None  Visit Diagnoses       Prenatal care in second trimester    -  Primary            #1. 22w0d GESTATION  PTL precautions reviewed.  FM reviewed.  Labs UTD. A+  Contraception: declines.      RTC in 4 weeks

## 2024-08-27 ENCOUNTER — ROUTINE PRENATAL (OUTPATIENT)
Dept: OBGYN CLINIC | Facility: CLINIC | Age: 34
End: 2024-08-27

## 2024-08-27 VITALS
BODY MASS INDEX: 29.81 KG/M2 | HEIGHT: 62 IN | DIASTOLIC BLOOD PRESSURE: 62 MMHG | SYSTOLIC BLOOD PRESSURE: 110 MMHG | WEIGHT: 162 LBS

## 2024-08-27 DIAGNOSIS — Z34.92 PRENATAL CARE IN SECOND TRIMESTER: Primary | ICD-10-CM

## 2024-08-27 PROCEDURE — PNV

## 2024-08-28 NOTE — PATIENT COMMUNICATION
Left voicemail encouraging patient to call [phone] or reply to text messages being sent to help schedule this appointment. Reach out to [phone] with any questions or concerns.

## 2024-09-03 NOTE — TELEPHONE ENCOUNTER
Patient called to r/s several ob appts due to work conflict. Able to r/s a few however the following dates did not have availability in same week that met pt requests & still need r/s:  9/23 (next avail in [place] or [place] was 10/3)  10/31  11/13 (office request)    Pt requests if in [place]: early morning around 7:15 or 3:00 or later  If [place]: 4:00 or later

## 2024-09-04 NOTE — PATIENT COMMUNICATION
Left voicemail encouraging patient to call [phone] or reply to text messages being sent to help schedule this appointment. Reach out to [phone] with any questions or concerns.     Referral has since been closed.

## 2024-09-22 NOTE — PROGRESS NOTES
OB/GYN  PN Visit  Nicolle Feng  [id number]  2024  8:56 AM  Angela Donahue PA-C    S: 33 y.o.  25w5d here for PN visit. Pregnancy complicated by psoriatic arthritis.     OB complaints:  Denies c/o ha, no edema, no smoking, no DV.   No vb/lof  No cramping/ctxns or signs of PTL.    She reports that unless she eats smaller portions will still deal with some nausea and vomiting and food aversions.   Pt notes that she has not yet been able to make appt w rheumatology. Notes her work schedule can make it challenging. She does plan to pursue this though. She notes her psoriasis and arthritis are relatively well controlled without medication at this point. She feels well overall    O:    Pre- Vitals      Flowsheet Row Most Recent Value   Prenatal Assessment    Fetal Heart Rate 142   Fundal Height (cm) 25 cm   Movement Present   Prenatal Vitals    Blood Pressure 102/56   Weight - Scale 73.9 kg (163 lb)   Urine Albumin/Glucose    Dilation/Effacement/Station    Vaginal Drainage    Draining Fluid No   Edema    LLE Edema None   RLE Edema None   Facial Edema None              Gen: no acute distress, nonlabored breathing.  OB exam completed: fundal height, +FHT.  Urine: -/-    A/P:  #1. 25w5d GESTATION  Labor precautions reviewed  Fetal kick counts reviewed  Flu: will plan when available  Rhogam: NA-A+  Third tri labs: ordered  Breast pump: ordered      RTC in 4 weeks    Angela Donahue PA-C  2024  8:56 AM

## 2024-09-23 ENCOUNTER — ROUTINE PRENATAL (OUTPATIENT)
Dept: OBGYN CLINIC | Facility: CLINIC | Age: 34
End: 2024-09-23

## 2024-09-23 VITALS
SYSTOLIC BLOOD PRESSURE: 102 MMHG | WEIGHT: 163 LBS | DIASTOLIC BLOOD PRESSURE: 56 MMHG | BODY MASS INDEX: 30 KG/M2 | HEIGHT: 62 IN

## 2024-09-23 DIAGNOSIS — Z34.92 SECOND TRIMESTER PREGNANCY: Primary | ICD-10-CM

## 2024-09-23 LAB
DME PARACHUTE DELIVERY DATE REQUESTED: NORMAL
DME PARACHUTE ITEM DESCRIPTION: NORMAL
DME PARACHUTE ORDER STATUS: NORMAL
DME PARACHUTE SUPPLIER NAME: NORMAL
DME PARACHUTE SUPPLIER PHONE: NORMAL

## 2024-09-23 PROCEDURE — PNV: Performed by: PHYSICIAN ASSISTANT

## 2024-10-09 ENCOUNTER — APPOINTMENT (OUTPATIENT)
Dept: LAB | Facility: CLINIC | Age: 34
End: 2024-10-09
Payer: COMMERCIAL

## 2024-10-09 DIAGNOSIS — Z34.92 SECOND TRIMESTER PREGNANCY: ICD-10-CM

## 2024-10-09 DIAGNOSIS — Z31.430 ENCOUNTER OF FEMALE FOR TESTING FOR GENETIC DISEASE CARRIER STATUS FOR PROCREATIVE MANAGEMENT: ICD-10-CM

## 2024-10-09 DIAGNOSIS — Z34.92 PRENATAL CARE IN SECOND TRIMESTER: ICD-10-CM

## 2024-10-09 DIAGNOSIS — Z31.69 ENCOUNTER FOR PRECONCEPTION CONSULTATION: ICD-10-CM

## 2024-10-09 LAB
BASOPHILS # BLD AUTO: 0.03 THOUSANDS/ΜL (ref 0–0.1)
BASOPHILS NFR BLD AUTO: 0 % (ref 0–1)
EOSINOPHIL # BLD AUTO: 0.06 THOUSAND/ΜL (ref 0–0.61)
EOSINOPHIL NFR BLD AUTO: 1 % (ref 0–6)
ERYTHROCYTE [DISTWIDTH] IN BLOOD BY AUTOMATED COUNT: 14.1 % (ref 11.6–15.1)
GLUCOSE 1H P 50 G GLC PO SERPL-MCNC: 78 MG/DL (ref 70–134)
HCT VFR BLD AUTO: 34.6 % (ref 34.8–46.1)
HGB BLD-MCNC: 11.1 G/DL (ref 11.5–15.4)
IMM GRANULOCYTES # BLD AUTO: 0.04 THOUSAND/UL (ref 0–0.2)
IMM GRANULOCYTES NFR BLD AUTO: 0 % (ref 0–2)
LYMPHOCYTES # BLD AUTO: 1.85 THOUSANDS/ΜL (ref 0.6–4.47)
LYMPHOCYTES NFR BLD AUTO: 16 % (ref 14–44)
MCH RBC QN AUTO: 30.4 PG (ref 26.8–34.3)
MCHC RBC AUTO-ENTMCNC: 32.1 G/DL (ref 31.4–37.4)
MCV RBC AUTO: 95 FL (ref 82–98)
MONOCYTES # BLD AUTO: 0.74 THOUSAND/ΜL (ref 0.17–1.22)
MONOCYTES NFR BLD AUTO: 7 % (ref 4–12)
NEUTROPHILS # BLD AUTO: 8.68 THOUSANDS/ΜL (ref 1.85–7.62)
NEUTS SEG NFR BLD AUTO: 76 % (ref 43–75)
NRBC BLD AUTO-RTO: 0 /100 WBCS
PLATELET # BLD AUTO: 294 THOUSANDS/UL (ref 149–390)
PMV BLD AUTO: 9.3 FL (ref 8.9–12.7)
RBC # BLD AUTO: 3.65 MILLION/UL (ref 3.81–5.12)
WBC # BLD AUTO: 11.4 THOUSAND/UL (ref 4.31–10.16)

## 2024-10-09 PROCEDURE — 82950 GLUCOSE TEST: CPT

## 2024-10-09 PROCEDURE — 86787 VARICELLA-ZOSTER ANTIBODY: CPT

## 2024-10-09 PROCEDURE — 85025 COMPLETE CBC W/AUTO DIFF WBC: CPT

## 2024-10-09 PROCEDURE — 36415 COLL VENOUS BLD VENIPUNCTURE: CPT

## 2024-10-09 PROCEDURE — 86780 TREPONEMA PALLIDUM: CPT

## 2024-10-09 PROCEDURE — 82105 ALPHA-FETOPROTEIN SERUM: CPT

## 2024-10-10 PROBLEM — Z34.93 PRENATAL CARE IN THIRD TRIMESTER: Status: ACTIVE | Noted: 2024-10-10

## 2024-10-10 LAB
TREPONEMA PALLIDUM IGG+IGM AB [PRESENCE] IN SERUM OR PLASMA BY IMMUNOASSAY: NORMAL
VZV IGG SER QL IA: NORMAL

## 2024-10-10 NOTE — PROGRESS NOTES
OB/GYN  PN Visit  Nicolle Feng  [id number]  10/14/2024  12:40 PM  YOSELIN Valdes    S: 33 y.o.  28w6d here for PN visit. Pregnancy complicated by psoriatic arthritis.     OB complaints:  Denies c/o ha, no edema, no smoking, no DV.   No vb/lof  No cramping/ctxns or signs of PTL.    She reports feeling really well. +FM    She has rheumatology appt later today.    O:    Pre-Kathrine Vitals      Flowsheet Row Most Recent Value   Prenatal Assessment    Fetal Heart Rate 145   Fundal Height (cm) 29 cm   Movement Present   Prenatal Vitals    Blood Pressure 116/62   Weight - Scale 74.8 kg (165 lb)   Urine Albumin/Glucose    Dilation/Effacement/Station    Vaginal Drainage    Edema                 Gen: no acute distress, nonlabored breathing.  OB exam completed: fundal height, +FHT.  Urine: -/-    Problem List Items Addressed This Visit       Psoriatic arthritis (HCC)    Prenatal care in third trimester - Primary      28w6d GESTATION  Labor precautions reviewed  Fetal kick counts reviewed  Tdap: given today  RSV: recommended between 32-36 weeks.  Rhogam: NA-A+  Labs UTD  Breast pump: ordered  Contraception: declined, but will consider. She has never been on BC prior.          Other Visit Diagnoses       Encounter for immunization        Relevant Orders    Tdap Vaccine greater than or equal to 8yo                 RTC in 2 weeks    YOSELIN Valdes  10/14/2024  12:40 PM

## 2024-10-10 NOTE — ASSESSMENT & PLAN NOTE
28w6d GESTATION  Labor precautions reviewed  Fetal kick counts reviewed  Tdap: given today  RSV: recommended between 32-36 weeks.  Rhogam: NA-A+  Labs UTD  Breast pump: ordered  Contraception: declined, but will consider. She has never been on BC prior.

## 2024-10-12 LAB
2ND TRIMESTER 4 SCREEN SERPL-IMP: NORMAL
AFP ADJ MOM SERPL: NORMAL
AFP INTERP AMN-IMP: NORMAL
AFP INTERP SERPL-IMP: NORMAL
AFP INTERP SERPL-IMP: NORMAL
AFP SERPL-MCNC: 387.8 NG/ML
AGE AT DELIVERY: 32 YR
GA METHOD: NORMAL
GA: 28.1 WEEKS
IDDM PATIENT QL: NO
MULTIPLE PREGNANCY: NO
NEURAL TUBE DEFECT RISK FETUS: NORMAL %

## 2024-10-14 ENCOUNTER — CONSULT (OUTPATIENT)
Age: 34
End: 2024-10-14
Payer: COMMERCIAL

## 2024-10-14 ENCOUNTER — ROUTINE PRENATAL (OUTPATIENT)
Dept: OBGYN CLINIC | Facility: CLINIC | Age: 34
End: 2024-10-14
Payer: COMMERCIAL

## 2024-10-14 VITALS
HEIGHT: 62 IN | BODY MASS INDEX: 30.36 KG/M2 | WEIGHT: 165 LBS | DIASTOLIC BLOOD PRESSURE: 62 MMHG | SYSTOLIC BLOOD PRESSURE: 116 MMHG

## 2024-10-14 VITALS
BODY MASS INDEX: 30.4 KG/M2 | HEART RATE: 95 BPM | WEIGHT: 165.2 LBS | SYSTOLIC BLOOD PRESSURE: 132 MMHG | HEIGHT: 62 IN | OXYGEN SATURATION: 98 % | DIASTOLIC BLOOD PRESSURE: 78 MMHG | TEMPERATURE: 98.3 F

## 2024-10-14 DIAGNOSIS — O99.891 MATERNAL ARTHRITIS COMPLICATING PREGNANCY: ICD-10-CM

## 2024-10-14 DIAGNOSIS — M19.90 MATERNAL ARTHRITIS COMPLICATING PREGNANCY: ICD-10-CM

## 2024-10-14 DIAGNOSIS — Z34.93 PRENATAL CARE IN THIRD TRIMESTER: Primary | ICD-10-CM

## 2024-10-14 DIAGNOSIS — L40.50 PSORIATIC ARTHRITIS (HCC): ICD-10-CM

## 2024-10-14 DIAGNOSIS — Z3A.20 20 WEEKS GESTATION OF PREGNANCY: ICD-10-CM

## 2024-10-14 DIAGNOSIS — Z23 ENCOUNTER FOR IMMUNIZATION: ICD-10-CM

## 2024-10-14 DIAGNOSIS — L40.9 PSORIASIS: Primary | ICD-10-CM

## 2024-10-14 DIAGNOSIS — Z79.899 HIGH RISK MEDICATION USE: ICD-10-CM

## 2024-10-14 PROCEDURE — PNV

## 2024-10-14 PROCEDURE — 99204 OFFICE O/P NEW MOD 45 MIN: CPT | Performed by: INTERNAL MEDICINE

## 2024-10-14 PROCEDURE — 90715 TDAP VACCINE 7 YRS/> IM: CPT

## 2024-10-14 PROCEDURE — 90471 IMMUNIZATION ADMIN: CPT

## 2024-10-14 RX ORDER — HYDROCORTISONE 25 MG/G
OINTMENT TOPICAL 2 TIMES DAILY
Qty: 30 G | Refills: 6 | Status: SHIPPED | OUTPATIENT
Start: 2024-10-14

## 2024-10-14 RX ORDER — CLOBETASOL PROPIONATE 0.5 MG/G
OINTMENT TOPICAL 2 TIMES DAILY
Qty: 60 G | Refills: 5 | Status: SHIPPED | OUTPATIENT
Start: 2024-10-14

## 2024-10-14 RX ORDER — CLOBETASOL PROPIONATE 0.5 MG/ML
SOLUTION TOPICAL 2 TIMES DAILY
Qty: 50 ML | Refills: 6 | Status: SHIPPED | OUTPATIENT
Start: 2024-10-14

## 2024-10-14 NOTE — PROGRESS NOTES
Tdap given in left deltoid per pt request . Pt tolerated well. Previous reaction to HPV vaccine, but no reactions to tdap or flu. Pt given VIS at time of administration.   Alona MURRELL

## 2024-10-14 NOTE — PATIENT INSTRUCTIONS
Please apply topical steroid as ordered   Please let me know if you experience any psoriatic arthritis flares during pregnancy, so that I can send you a short course of steroids

## 2024-10-15 NOTE — PROGRESS NOTES
Ambulatory Visit  Name: Nicolle Feng      : 1990      MRN: [id number]  Encounter Provider: Andre Drake MD  Encounter Date: 10/14/2024   Encounter department: [facility] RHEUMATOLOGY [address]    Assessment & Plan  20 weeks gestation of pregnancy    Orders:    Ambulatory Referral to Rheumatology    Psoriatic arthritis (HCC)  Nicolle Feng is a 33 y.o. female with past medical history of psoriasis and psoriatic arthritis and currently 28 week pregnant who presents as an initial referral. She was diagnosed with psoriasis in  and psoriatic arthritis (knees, lower back, ankles, toe nail pitting) in . She was previously seeing rheumatologist at [facility] and he was retired and so patient changed her care here.     From  to , she fairly managed her symptoms with conservative management. In , she was started on Riskanizumab by her dermatologist for uncontrolled skin psoriasis plaques and also she had active joint pains and stiffness in her shoulders, hands and knees. She reports her psoriasis and psoriatic arthritis were well-controlled on that medication without any active complaints. Riskanizumab was stopped 20 weeks before she became pregnant, she does not have any symptoms until 2-3 months ago and slowly started developing psoriasis on her scalp, back of right ear and joint stiffness in her shoulders, hands and knees that gets better with activity. She is currently not taking anything for the pain. No signs of axial disease, enthesitis, dactylitis, IBD symptoms, nail changes, uveitis in today's visit.    She reports her symptoms are fairly mild and she can get away with tylenol by controlling symptoms but without any DMARDs until the time of delivery and may be 6 months from then until she finishes breast feeding. She was given the option of trying TNFi Certolizumab pegol which does not pass through placenta and is safe compared to other biologics during pregnancy. She understands the  rationale behind it but she feels her symptoms are not that severe currently and she will definitely let me know if her symptoms worsen and require the use of DMARD. She will also let me know if she experiences a flare up of her psoriatic arthritis which I plan to treat with short steroid course and she is agreeable for it.    Will treat her active skin psoriasis with topical steroids and will obtain baseline X-rays of joints once she finishes her pregnancy course.    Orders:    Ambulatory Referral to Rheumatology    Sedimentation rate, automated; Future    C-reactive protein; Future    Maternal arthritis complicating pregnancy    Orders:    Ambulatory Referral to Rheumatology    clobetasol (TEMOVATE) 0.05 % external solution; Apply topically 2 (two) times a day For scalp psoriasis    hydrocortisone 2.5 % ointment; Apply topically 2 (two) times a day For face psoriasis    clobetasol (TEMOVATE) 0.05 % ointment; Apply topically 2 (two) times a day For body psoriasis    Psoriasis         High risk medication use    Orders:    Quantiferon TB Gold Plus Assay; Future      History of Present Illness     Nicolle Feng is a 33 y.o. female with past medical history of psoriasis and psoriatic arthritis and currently 28 week pregnant who presents as an initial referral. She was diagnosed with psoriasis in 2008 and psoriatic arthritis (knees, lower back, ankles, toe nail pitting) in 2017. She was previously seeing rheumatologist at [facility] and he was retired and so patient changed her care here.     From 2017 to 2021, she fairly managed her symptoms with conservative management. In 2021, she was started on Riskanizumab by her dermatologist for uncontrolled skin psoriasis plaques and also she had active joint pains and stiffness in her shoulders, hands and knees. She reports her psoriasis and psoriatic arthritis were well-controlled on that medication without any active complaints. Riskanizumab was stopped 20 weeks before she became  pregnant, she does not have any symptoms until 2-3 months ago and slowly started developing psoriasis on her scalp, back of right ear and joint stiffness in her shoulders, hands and knees that gets better with activity. She is currently not taking anything for the pain. She has some chronic back pain and stiffness but she does not think it is acutely worsened from psoriatic arthritis by not being on treatment for it.    She denies enthesitis, dactylitis, IBD symptoms, nail changes, uveitis.     History obtained from : patient  Review of Systems  Rest of ROS are negative except as noted in HPI    Pertinent Medical History       Medical History Reviewed by provider this encounter:       Past Medical History   Past Medical History:   Diagnosis Date    Anemia 1998    Depression 2012    Migraine 2012    Psoriasis     Psoriatic arthritis (HCC)     Varicella      Past Surgical History:   Procedure Laterality Date    TONSILECTOMY AND ADNOIDECTOMY      age 9     Family History   Problem Relation Age of Onset    Thyroid disease Mother     Rashes / Skin problems Father         Psoriasis    Alcohol abuse Father     Drug abuse Father     Ovarian cysts Sister     Breast cancer Maternal Grandmother     Hypertension Maternal Grandmother     Osteoporosis Maternal Grandmother     Cancer Maternal Grandfather     Heart attack Maternal Grandfather     Osteoarthritis Maternal Grandfather     Esophageal cancer Maternal Grandfather     Rheum arthritis Maternal Grandfather     Seizures Paternal Grandmother     Alcohol abuse Paternal Grandfather     Dementia Paternal Grandfather      Current Outpatient Medications on File Prior to Visit   Medication Sig Dispense Refill    Omega-3 Fatty Acids (OMEGA 3 PO) Take by mouth      ondansetron (ZOFRAN) 4 mg tablet Take 1 tablet (4 mg total) by mouth every 8 (eight) hours as needed for nausea or vomiting 30 tablet 1    Prenatal Vit-Fe Fumarate-FA (Prenatal/Folic Acid) TABS Take 1 tablet by mouth daily  "30 tablet 11    Pyridoxine HCl (vitamin B-6) 25 MG tablet        No current facility-administered medications on file prior to visit.     Allergies   Allergen Reactions    Sulfa Antibiotics Hives      Current Outpatient Medications on File Prior to Visit   Medication Sig Dispense Refill    Omega-3 Fatty Acids (OMEGA 3 PO) Take by mouth      ondansetron (ZOFRAN) 4 mg tablet Take 1 tablet (4 mg total) by mouth every 8 (eight) hours as needed for nausea or vomiting 30 tablet 1    Prenatal Vit-Fe Fumarate-FA (Prenatal/Folic Acid) TABS Take 1 tablet by mouth daily 30 tablet 11    Pyridoxine HCl (vitamin B-6) 25 MG tablet        No current facility-administered medications on file prior to visit.      Social History     Tobacco Use    Smoking status: Never    Smokeless tobacco: Never   Vaping Use    Vaping status: Never Used   Substance and Sexual Activity    Alcohol use: Not Currently     Comment: Not weekly. Occasional. 2 times a month    Drug use: Never    Sexual activity: Yes     Partners: Male         Objective     /78   Pulse 95   Temp 98.3 °F (36.8 °C)   Ht 5' 2\" (1.575 m)   Wt 74.9 kg (165 lb 3.2 oz)   LMP 03/26/2024 (Approximate)   SpO2 98%   BMI 30.22 kg/m²     Physical Exam  Constitutional:       Appearance: Normal appearance.   HENT:      Head: Normocephalic and atraumatic.   Musculoskeletal:      Comments: No swelling, tenderness, erythema, deformities of her bilateral wrists, MCPs, PIPs, DIPs.  No swelling, tenderness, erythema, deformities of her elbows, shoulders, hips, knees, ankles and feet joints  ROM is normal for all of the joints     Skin:     Findings: No rash.   Neurological:      General: No focal deficit present.      Mental Status: She is alert and oriented to person, place, and time.   Psychiatric:         Mood and Affect: Mood normal.         "

## 2024-10-15 NOTE — ASSESSMENT & PLAN NOTE
Nicolle Feng is a 33 y.o. female with past medical history of psoriasis and psoriatic arthritis and currently 28 week pregnant who presents as an initial referral. She was diagnosed with psoriasis in 2008 and psoriatic arthritis (knees, lower back, ankles, toe nail pitting) in 2017. She was previously seeing rheumatologist at [facility] and he was retired and so patient changed her care here.     From 2017 to 2021, she fairly managed her symptoms with conservative management. In 2021, she was started on Riskanizumab by her dermatologist for uncontrolled skin psoriasis plaques and also she had active joint pains and stiffness in her shoulders, hands and knees. She reports her psoriasis and psoriatic arthritis were well-controlled on that medication without any active complaints. Riskanizumab was stopped 20 weeks before she became pregnant, she does not have any symptoms until 2-3 months ago and slowly started developing psoriasis on her scalp, back of right ear and joint stiffness in her shoulders, hands and knees that gets better with activity. She is currently not taking anything for the pain. No signs of axial disease, enthesitis, dactylitis, IBD symptoms, nail changes, uveitis in today's visit.    She reports her symptoms are fairly mild and she can get away with tylenol by controlling symptoms but without any DMARDs until the time of delivery and may be 6 months from then until she finishes breast feeding. She was given the option of trying TNFi Certolizumab pegol which does not pass through placenta and is safe compared to other biologics during pregnancy. She understands the rationale behind it but she feels her symptoms are not that severe currently and she will definitely let me know if her symptoms worsen and require the use of DMARD. She will also let me know if she experiences a flare up of her psoriatic arthritis which I plan to treat with short steroid course and she is agreeable for it.    Will treat her  active skin psoriasis with topical steroids and will obtain baseline X-rays of joints once she finishes her pregnancy course.    Orders:    Ambulatory Referral to Rheumatology    Sedimentation rate, automated; Future    C-reactive protein; Future

## 2024-10-21 DIAGNOSIS — Z34.93 PRENATAL CARE, THIRD TRIMESTER: Primary | ICD-10-CM

## 2024-10-31 ENCOUNTER — ROUTINE PRENATAL (OUTPATIENT)
Dept: OBGYN CLINIC | Facility: CLINIC | Age: 34
End: 2024-10-31

## 2024-10-31 VITALS — DIASTOLIC BLOOD PRESSURE: 70 MMHG | WEIGHT: 169 LBS | SYSTOLIC BLOOD PRESSURE: 112 MMHG | BODY MASS INDEX: 30.91 KG/M2

## 2024-10-31 DIAGNOSIS — Z3A.31 31 WEEKS GESTATION OF PREGNANCY: Primary | ICD-10-CM

## 2024-10-31 DIAGNOSIS — L40.50 PSORIATIC ARTHRITIS (HCC): ICD-10-CM

## 2024-10-31 PROCEDURE — PNV: Performed by: STUDENT IN AN ORGANIZED HEALTH CARE EDUCATION/TRAINING PROGRAM

## 2024-10-31 NOTE — ASSESSMENT & PLAN NOTE
Nicolle presents today for her 28 week [facility] visit.  She is currently 31w2d.    Vitals:    10/31/24 1600   BP: 112/70       The patient has completed her 28 week labs and they have been reviewed.  Patient's blood type is A +    I have given the patient the third trimester OB packet with instructions to review and discuss it with her partner, complete it and bring it with her to the labor and delivery.      Warning signs in pregnancy:    I have reviewed the warning signs of pregnancy in the third trimester and advised patient to notify provider immediately if she experiences any of the following:  vaginal bleeding, baby moving less than normal or not at all, or abdominal pain.    Contraceptive options were reviewed, including hormonal methods, both combination (pill, patch, vaginal ring) and progesterone-only (pill, Depo Provera and Nexplanon), intrauterine devices (Mirena, Colette and Paragard), barrier methods (condoms, diaphragm) and male/female sterilization. All questions have been answered to her satisfaction.

## 2024-10-31 NOTE — PROGRESS NOTES
Assessment/Plan  Problem List Items Addressed This Visit       Psoriatic arthritis (HCC)    Relevant Orders    Ambulatory Referral to Maternal Fetal Medicine    31 weeks gestation of pregnancy - Primary       Nicolle presents today for her 28 week MCP visit.  She is currently 31w2d.    Vitals:    10/31/24 1600   BP: 112/70       The patient has completed her 28 week labs and they have been reviewed.  Patient's blood type is A +    I have given the patient the third trimester OB packet with instructions to review and discuss it with her partner, complete it and bring it with her to the labor and delivery.      Warning signs in pregnancy:    I have reviewed the warning signs of pregnancy in the third trimester and advised patient to notify provider immediately if she experiences any of the following:  vaginal bleeding, baby moving less than normal or not at all, or abdominal pain.    Contraceptive options were reviewed, including hormonal methods, both combination (pill, patch, vaginal ring) and progesterone-only (pill, Depo Provera and Nexplanon), intrauterine devices (Mirena, Colette and Paragard), barrier methods (condoms, diaphragm) and male/female sterilization. All questions have been answered to her satisfaction.              Subjective    Nicolle is a 33 y.o. female,  with an Estimated Date of Delivery: 24 with a current gestational age of 31w2d. Patient reports no complaints. Fetal movement: active.     History  The following portions of the patient's history were reviewed and updated as appropriate: allergies, current medications, past family history, past medical history, past social history, past surgical history and problem list.    Objective  Vitals:    10/31/24 1600   BP: 112/70     FHT: 140s  FH: 30  Urine: trace/neg

## 2024-11-01 ENCOUNTER — TELEPHONE (OUTPATIENT)
Dept: PERINATAL CARE | Facility: OTHER | Age: 34
End: 2024-11-01

## 2024-11-01 NOTE — TELEPHONE ENCOUNTER
Osmel regarding her appt on 11/12/24 at 12:45 in [facility].If you have any question please call [phone]

## 2024-11-06 ENCOUNTER — CLINICAL SUPPORT (OUTPATIENT)
Dept: PEDIATRICS CLINIC | Facility: CLINIC | Age: 34
End: 2024-11-06

## 2024-11-06 DIAGNOSIS — Z76.81 PEDIATRIC PRE-BIRTH VISIT FOR EXPECTANT PARENT(S): Primary | ICD-10-CM

## 2024-11-06 PROCEDURE — RECHECK

## 2024-11-13 NOTE — ASSESSMENT & PLAN NOTE
- Continue PNV  - Labor precautions reviewed  - Fetal kick counts reviewed  - Labs: UTD  -Pap:  NILM/HPV-  -Rh: +  - Genetics: AFP completed  - Ultrasounds: L2 completed  - Tdap: recieved  - Flu Shot: plans for work  - RSV: given today  - Rhogam: n/a  - Delivery:  w/w/o epidural.   - Contraception: declined, options reviewed  - Breastfeeding: yes, pump ordered  - Pediatrician: list provided in OB packet  -Delivery Consent & Packet: given at 30 weeks with Dr. Pascal  -GBS: at 36 weeks  - RTO in 2 weeks

## 2024-11-13 NOTE — PROGRESS NOTES
OB/GYN  PN Visit  Nicolle Feng  [id number]  11/15/2024  3:39 PM  YOSELIN Valdes    S: 33 y.o.  33w3d here for PN visit. Pregnancy complicated by psoriatic arthritis.     OB complaints:  Denies c/o ha, no edema, no smoking, no DV.   No vb/lof  No cramping/ctxns or signs of PTL.    She reports feeling really well. +FM. She has some round ligament and hip pain.       O:    Pre-Kathrine Vitals      Flowsheet Row Most Recent Value   Prenatal Assessment    Fetal Heart Rate 155   Fundal Height (cm) 33 cm   Movement Present   Prenatal Vitals    Blood Pressure 120/62   Weight - Scale 78.5 kg (173 lb)   Urine Albumin/Glucose    Dilation/Effacement/Station    Vaginal Drainage    Edema                   Gen: no acute distress, nonlabored breathing.  OB exam completed: fundal height, +FHT.  Urine: -/-    Problem List Items Addressed This Visit       Psoriatic arthritis (HCC) - Primary    Referral provided to Rheumatology  Labs UTD         Prenatal care in third trimester    - Continue PNV  - Labor precautions reviewed  - Fetal kick counts reviewed  - Labs: UTD  -Pap:  NILM/HPV-  -Rh: +  - Genetics: AFP completed  - Ultrasounds: L2 completed  - Tdap: recieved  - Flu Shot: plans for work  - RSV: given today  - Rhogam: n/a  - Delivery:  w/w/o epidural.   - Contraception: declined, options reviewed  - Breastfeeding: yes, pump ordered  - Pediatrician: list provided in OB packet  -Delivery Consent & Packet: given at 30 weeks with Dr. Pascal  -GBS: at 36 weeks  - RTO in 2 weeks           Relevant Orders    Respiratory Syncytial Virus (RSV) vaccine (recombinant) (Abrysvo)    Pregnancy related hip pain in third trimester, antepartum    -Advised daily lower body stretches.  -Recommended belly support band or pregnancy tape.  -Reviewed pelvic floor PT. Patient can request referral at any time.                       YOSELIN Valdes  11/15/2024  3:39 PM

## 2024-11-15 ENCOUNTER — ULTRASOUND (OUTPATIENT)
Facility: HOSPITAL | Age: 34
End: 2024-11-15
Attending: STUDENT IN AN ORGANIZED HEALTH CARE EDUCATION/TRAINING PROGRAM
Payer: COMMERCIAL

## 2024-11-15 ENCOUNTER — ROUTINE PRENATAL (OUTPATIENT)
Dept: OBGYN CLINIC | Facility: CLINIC | Age: 34
End: 2024-11-15
Payer: COMMERCIAL

## 2024-11-15 VITALS
HEART RATE: 108 BPM | BODY MASS INDEX: 31.77 KG/M2 | SYSTOLIC BLOOD PRESSURE: 122 MMHG | DIASTOLIC BLOOD PRESSURE: 62 MMHG | HEIGHT: 62 IN | WEIGHT: 172.62 LBS

## 2024-11-15 VITALS
WEIGHT: 173 LBS | HEIGHT: 62 IN | DIASTOLIC BLOOD PRESSURE: 62 MMHG | BODY MASS INDEX: 31.83 KG/M2 | SYSTOLIC BLOOD PRESSURE: 120 MMHG

## 2024-11-15 DIAGNOSIS — Z3A.33 33 WEEKS GESTATION OF PREGNANCY: Primary | ICD-10-CM

## 2024-11-15 DIAGNOSIS — L40.50 PSORIATIC ARTHRITIS (HCC): Primary | ICD-10-CM

## 2024-11-15 DIAGNOSIS — Z34.93 PRENATAL CARE IN THIRD TRIMESTER: ICD-10-CM

## 2024-11-15 DIAGNOSIS — O26.893 PREGNANCY RELATED HIP PAIN IN THIRD TRIMESTER, ANTEPARTUM: ICD-10-CM

## 2024-11-15 DIAGNOSIS — L40.50 PSORIATIC ARTHRITIS (HCC): ICD-10-CM

## 2024-11-15 DIAGNOSIS — Z36.89 ENCOUNTER FOR ULTRASOUND TO CHECK FETAL GROWTH: ICD-10-CM

## 2024-11-15 DIAGNOSIS — M25.559 PREGNANCY RELATED HIP PAIN IN THIRD TRIMESTER, ANTEPARTUM: ICD-10-CM

## 2024-11-15 PROCEDURE — 90678 RSV VACC PREF BIVALENT IM: CPT

## 2024-11-15 PROCEDURE — 90471 IMMUNIZATION ADMIN: CPT

## 2024-11-15 PROCEDURE — 99212 OFFICE O/P EST SF 10 MIN: CPT | Performed by: OBSTETRICS & GYNECOLOGY

## 2024-11-15 PROCEDURE — 76816 OB US FOLLOW-UP PER FETUS: CPT | Performed by: OBSTETRICS & GYNECOLOGY

## 2024-11-15 PROCEDURE — PNV

## 2024-11-15 NOTE — PROGRESS NOTES
Rsv administered in left deltoid . Pt tolerated well. Previous history of reaction to gardasil vaccine but no other reactions to any other vaccines since. Pt given VIS at time of administration.   Alona MURRELL

## 2024-11-15 NOTE — ASSESSMENT & PLAN NOTE
-Advised daily lower body stretches.  -Recommended belly support band or pregnancy tape.  -Reviewed pelvic floor PT. Patient can request referral at any time.

## 2024-11-17 NOTE — PROGRESS NOTES
"[facility]: Ms. Feng was seen today for fetal growth assessment ultrasound.  See ultrasound report under \"OB Procedures\" tab.   The time spent on this established patient on the encounter date included 5 minutes previsit service time reviewing records and precharting, 5 minutes face-to-face service time counseling regarding results and coordinating care, and  4 minutes charting, totalling 14 minutes.  Please don't hesitate to contact our office with any concerns or questions.  -Marlene Mayo MD    "

## 2024-11-19 DIAGNOSIS — Z3A.34 34 WEEKS GESTATION OF PREGNANCY: Primary | ICD-10-CM

## 2024-11-23 ENCOUNTER — CLINICAL SUPPORT (OUTPATIENT)
Age: 34
End: 2024-11-23

## 2024-11-23 DIAGNOSIS — Z32.2 ENCOUNTER FOR CHILDBIRTH INSTRUCTION: Primary | ICD-10-CM

## 2024-11-25 NOTE — PROGRESS NOTES
OB/GYN  PN Visit  Nicolle Feng  [id number]  2024  3:41 PM  YOSELIN Valdes    S: 33 y.o.  35w0d here for PN visit. Pregnancy complicated by psoriatic arthritis.     OB complaints:  Denies c/o ha, no edema, no smoking, no DV.   No vb/lof  No cramping/ctxns or signs of PTL.      She reports numbness and tingling of hands from swelling of hands.      O:    Pre-Kathrine Vitals      Flowsheet Row Most Recent Value   Prenatal Assessment    Fetal Heart Rate 150   Fundal Height (cm) 35 cm   Movement Present   Prenatal Vitals    Blood Pressure 122/68   Weight - Scale 78.5 kg (173 lb)   Urine Albumin/Glucose    Dilation/Effacement/Station    Vaginal Drainage    Edema                     Gen: no acute distress, nonlabored breathing.  OB exam completed: fundal height, +FHT.  Urine: -/-    Problem List Items Addressed This Visit       Prenatal care in third trimester - Primary    - Continue PNV  - Labor precautions reviewed  - Fetal kick counts reviewed  - Labs: UTD  -Pap:  NILM/HPV-  -Rh: +  - Genetics: AFP completed  - Ultrasounds: L2 completed  - Tdap: recieved  - Flu Shot: received  - RSV: recieved  - Rhogam: n/a  - Delivery:  w/w/o epidural.   - Contraception: declined, options reviewed  - Breastfeeding: yes, pump ordered  - Pediatrician: list provided in OB packet  -Delivery Consent & Packet: given at 30 weeks with Dr. Pascal  -GBS: at 36 weeks  - RTO in 2 weeks                   YOSELIN Valdes  2024  3:41 PM

## 2024-11-25 NOTE — ASSESSMENT & PLAN NOTE
- Continue PNV  - Labor precautions reviewed  - Fetal kick counts reviewed  - Labs: UTD  -Pap:  NILM/HPV-  -Rh: +  - Genetics: AFP completed  - Ultrasounds: L2 completed  - Tdap: recieved  - Flu Shot: received  - RSV: recieved  - Rhogam: n/a  - Delivery:  w/w/o epidural.   - Contraception: declined, options reviewed  - Breastfeeding: yes, pump ordered  - Pediatrician: list provided in OB packet  -Delivery Consent & Packet: given at 30 weeks with Dr. Pascal  -GBS: at 36 weeks  - RTO in 2 weeks

## 2024-11-26 ENCOUNTER — ROUTINE PRENATAL (OUTPATIENT)
Dept: OBGYN CLINIC | Facility: CLINIC | Age: 34
End: 2024-11-26

## 2024-11-26 VITALS
BODY MASS INDEX: 31.83 KG/M2 | WEIGHT: 173 LBS | DIASTOLIC BLOOD PRESSURE: 68 MMHG | HEIGHT: 62 IN | SYSTOLIC BLOOD PRESSURE: 122 MMHG

## 2024-11-26 DIAGNOSIS — Z34.93 PRENATAL CARE IN THIRD TRIMESTER: Primary | ICD-10-CM

## 2024-11-26 PROCEDURE — PNV

## 2024-11-27 ENCOUNTER — EVALUATION (OUTPATIENT)
Dept: PHYSICAL THERAPY | Facility: REHABILITATION | Age: 34
End: 2024-11-27
Payer: COMMERCIAL

## 2024-11-27 DIAGNOSIS — R10.2 PAIN OF PELVIC GIRDLE: Primary | ICD-10-CM

## 2024-11-27 DIAGNOSIS — Z3A.34 34 WEEKS GESTATION OF PREGNANCY: ICD-10-CM

## 2024-11-27 PROCEDURE — 97162 PT EVAL MOD COMPLEX 30 MIN: CPT | Performed by: PHYSICAL THERAPIST

## 2024-11-27 PROCEDURE — 97110 THERAPEUTIC EXERCISES: CPT | Performed by: PHYSICAL THERAPIST

## 2024-11-27 PROCEDURE — 97530 THERAPEUTIC ACTIVITIES: CPT | Performed by: PHYSICAL THERAPIST

## 2024-11-27 NOTE — PROGRESS NOTES
PT Evaluation     Today's date: 2024  Patient name: Nicolle Feng  : 1990  MRN: [id number]  Referring provider: Leticia Smart CRNP  Dx:   Encounter Diagnosis     ICD-10-CM    1. Pain of pelvic girdle  R10.2       2. 34 weeks gestation of pregnancy  Z3A.34 Ambulatory Referral to Physical Therapy                     Assessment    Assessment details: Nicolle Feng is a 33 y.o. primiparous female 35 weeks pregnant with complaints of anterior pelvic girdle pain during pregnancy.  Patient's presentation is consistent with pubic symphysis pain with load transfer impairments with the following impairments found on evaluation: decreased tolerance to single leg/asmmetrical loading and resistance, decreased abdominal strength. These impairments contribute to the following functional limitations: decreased tolerance to dressing, transfers, and work tasks.  Nicolle Feng is a good candidate for physical therapy and would benefit from skilled physical therapy (specifically, TA strengthening, functional modifications, L&D prep, post-partum education) to address the above impairments in order to allow the patient to achieve the goals listed and return to prior level of function.      During initial evaluation, education was provided on anatomy and function of the pelvic floor muscles, viscerosomatic convergence and regional interdependence of the lumbo-pelvic-hip complex, and pregnancy and postpartum MSK changes and rehab.  Patient also educated on diagnosis, plan of care and prognosis. Patient provided written and verbal consent for pelvic floor muscle exam. She is in agreement with recommended plan of care and goals for therapy, and demonstrates motivation for active participation in proposed plan of care.    Understanding of Dx/Px/POC: good     Prognosis: good    Goals  Patient will report painfree STS in 2 weeks.  Patient will report painfree moderate-depth squat in 2 weeks.  Patient will demonstrate independent  abdominal brace with functional activities in 4 weeks.  Patient will verbalize how/when to return postpartum for rehabilitation    Plan    Frequency: 1x week  Duration in weeks: 5  Plan of Care beginning date: 2024  Plan of Care expiration date: 1/3/2025  Treatment plan discussed with: referring physician and patient      PT Pelvic Floor Subjective:   History of Present Illness:   Anterior pelvic girdle pain during pregnancy- pubic symphysis and radiating laterally into both hips. This started 2 months ago and has progressively worsened. She is primiparous, currently 35 weeks pregnant. Pain is aching 6/10, with sharp moments.  Works full time as a - standing, sitting, squatting a lot for her workday. Most aggravating by deep squat and forward bend toward the floor, bed transfers (sitting up), donning pants/shoes/socks. No pain with car transfers and STS transition.  Have psoriatic arthritis- mostly knee pain.  Rare stress UI with sneezing; has constipation during pregnancy- takes Colace with good improve.  Exercise: walking 15 minutes, limited by pubic pain. Has attempted yoga and stretching, with some increased pregnancy. Early pregnancy- walking, yoga, light resistance training- 10-15#- has not done this in 3-4 months.  Due date 24, plans to work til Dec 20.    Denies signs/symptoms of PTL, gHTN, or GDM.  Pain:     Current pain ratin    At best pain ratin    At worst pain ratin      Objective     Strength/Myotome Testing     Lumbar   Left   Normal strength    Right   Normal strength    Tests     Lumbar     Left   Negative passive SLR and slump test.     Right   Negative passive SLR and slump test.     Left Pelvic Girdle/Sacrum   Positive: thigh thrust and active SLR test.     Right Pelvic Girdle/Sacrum   Positive: thigh thrust and active SLR test.     Left Hip   Positive VITALIY.     Right Hip   Positive VITALIY.     Ambulation     Observational Gait   Gait: within  functional limits   Decreased walking speed and stride length.     Functional Assessment      Squat    Pain.     Single Leg Stance   Left: 10 seconds    Comments  PS pain and (-) mild trendelenburg with SLS bilaterally    General Comments:      Lumbar Comments  PS pain with forward fold and back bend in standing.  Pain with MMT, strength not grossly limited: hip flexion, hip adduction, hip abduction, knee extension, knee flexion             Precautions: currently pregnancy      POC expires  Auth Status? (BOMN, approved, pending) Unit limit (Daily) Auth Start date Expiration date PT/OT + Visit Limit?   01/03/2025 BOMN    60 vppy (ends 11/30/24)     Date of Service 11/27        Visits Used 1        Visits Remaining         Patient Education         Medbridge acct created?         PFM anatomy and function 5'        Postpartum rehab TA iso w exhale from PPD 1        SPD modification Avoid/modify asymmetrical movements         Labor and delivery prep Perineal massage nad L&D positions handout provided ** review/practice       Neuro Re-Ed         DB                  Ther Ex         TA+exhale Seated, STS, squat        TA strengthening  **                                           Ther Activity         L&D prep  **                         Manual Ther         PFM exam nv **       Ortho exam completed        Hip mobs PRN                                   Modalities

## 2024-12-02 ENCOUNTER — ROUTINE PRENATAL (OUTPATIENT)
Dept: OBGYN CLINIC | Facility: CLINIC | Age: 34
End: 2024-12-02

## 2024-12-02 VITALS
SYSTOLIC BLOOD PRESSURE: 110 MMHG | BODY MASS INDEX: 32.65 KG/M2 | WEIGHT: 177.4 LBS | DIASTOLIC BLOOD PRESSURE: 70 MMHG | HEIGHT: 62 IN

## 2024-12-02 DIAGNOSIS — L40.50 PSORIATIC ARTHRITIS (HCC): ICD-10-CM

## 2024-12-02 DIAGNOSIS — Z3A.35 35 WEEKS GESTATION OF PREGNANCY: Primary | ICD-10-CM

## 2024-12-02 PROCEDURE — PNV: Performed by: OBSTETRICS & GYNECOLOGY

## 2024-12-02 NOTE — PROGRESS NOTES
OB/GYN  PN Visit  Nicolle Feng  [id number]  2024  10:02 PM  Dr. Cass Martinez MD    S: 33 y.o.  35w6d here for PN visit. She reports mild cramping. She denies leakage of fluid and vaginal bleeding. She reports good fetal movement. She denies nausea, vomiting, headache, domestic violence, and smoking. She reports edema of her hands and feet.  Her pregnancy is complicated by psoriatic arthritis.     O:  Pre-Kathrine Vitals      Flowsheet Row Most Recent Value   Prenatal Assessment    Fetal Heart Rate 135   Fundal Height (cm) 36 cm   Movement Present   Prenatal Vitals    Blood Pressure 110/70   Weight - Scale 80.5 kg (177 lb 6.4 oz)   Urine Albumin/Glucose    Dilation/Effacement/Station    Vaginal Drainage    Draining Fluid No   Edema    LLE Edema Trace   RLE Edema +1          Physical Exam  Vitals reviewed.   Constitutional:       General: She is not in acute distress.     Appearance: Normal appearance. She is well-developed. She is not ill-appearing, toxic-appearing or diaphoretic.   Cardiovascular:      Rate and Rhythm: Normal rate.   Pulmonary:      Effort: Pulmonary effort is normal. No respiratory distress.   Abdominal:      General: There is no distension.      Palpations: Abdomen is soft. There is no mass.      Tenderness: There is no abdominal tenderness. There is no guarding or rebound.   Genitourinary:     Comments: Gravid, nontender  Skin:     General: Skin is warm and dry.   Neurological:      Mental Status: She is alert and oriented to person, place, and time.   Psychiatric:         Mood and Affect: Mood normal.         Behavior: Behavior normal.         A/P:      Problem List          Unprioritized    Psoriatic arthritis (HCC)    Current Assessment & Plan   Established with Rheum on 10/14/24  Per Rheumatology:   She was diagnosed with psoriasis in  and psoriatic arthritis (knees, lower back, ankles, toe nail pitting) in 2017.   Symptoms currently well controlled but if necessary, she  may try DMARD (TNFi Certolizumab pegol which does not pass through placenta and is safe compared to other biologics during pregnancy)  If she experiences a flare up of her psoriatic arthritis - plan to treat with short steroid course and she is agreeable for it.   Will treat her active skin psoriasis with topical steroids and will obtain baseline X-rays of joints once she finishes her pregnancy course.          Family history of thyroid problem    Major depressive disorder in remission (HCC)    35 weeks gestation of pregnancy    Current Assessment & Plan   - Continue PNV  - Labor precautions reviewed  - Fetal kick counts reviewed  - Labs: UTD  - Genetics: AFP completed  - Ultrasounds: Most recent US wnl on 11/15/24; Will need position check next week!   - Tdap: Received  - Flu Shot: Received  - RSV: Received    - COVID: Vaccinated; booster encouraged   - Rhogam: N/A  - Delivery: , open to epidural  - Contraception: Declined, options reviewed previously  - Breastfeeding: Yes; pump ordered previously  - Pediatrician: [facility]   - IOL: Interested in 39wk IOL (aware we can schedule at 38 weeks)  - RTO in 1 week         Pregnancy related hip pain in third trimester, antepartum       Future Appointments   Date Time Provider Department Center   12/10/2024  3:45 PM YOSELIN Valdes [facility] BE Practice-Wo   2024  7:30 AM Marilin Wallace, PT BE PT 3 NEW BE 3rd & NEW   2024  4:15 PM Cass Martinez MD [facility] WOMEN Practice-Wo   2024  6:00 PM [facility] Practice-Wo   2024  3:15 PM Estrellita Tobin, PT BE PT 3 NEW BE 3rd & NEW   2024 10:00 AM Ca Stern MD [facility] WOMEN Practice-Wom   2024  8:45 AM Cass Martinez MD [facility] WOMEN Practice-Wom   2025  1:45 PM Cora Pascal MD [facility] WO BE Practice-Wo   2025  1:15 PM Andre Drake MD [facility] 8th RHEUM         Cass Martinez MD  2024  10:02 PM

## 2024-12-04 ENCOUNTER — OFFICE VISIT (OUTPATIENT)
Dept: PHYSICAL THERAPY | Facility: REHABILITATION | Age: 34
End: 2024-12-04
Payer: COMMERCIAL

## 2024-12-04 DIAGNOSIS — Z3A.34 34 WEEKS GESTATION OF PREGNANCY: Primary | ICD-10-CM

## 2024-12-04 DIAGNOSIS — R10.2 PAIN OF PELVIC GIRDLE: ICD-10-CM

## 2024-12-04 PROCEDURE — 97110 THERAPEUTIC EXERCISES: CPT

## 2024-12-04 PROCEDURE — 97112 NEUROMUSCULAR REEDUCATION: CPT

## 2024-12-04 NOTE — PROGRESS NOTES
Daily Note     Today's date: 2024  Patient name: Nicolle Feng  : 1990  MRN: [id number]  Referring provider: Leticia Smart CRNP  Dx: No diagnosis found.    Start Time: 1515  Stop Time: 1600  Total time in clinic (min): 45 minutes    Subjective: Pt reports she is feeling good, just a little passed 36 weeks.      Objective: See treatment diary below  /66    Assessment: Tolerated treatment well. Patient would benefit from continued PT. Focussed today's session on labor and birth positions while wearing the SEMG.  Sitting on Pball and quadriped were some of her most comfortable positions.  Able to achieve a good work phase but more challenged with endurance. Also performed Rebozzo techniques.        Plan: Continue per plan of care.      Precautions: currently pregnancy      POC expires  Auth Status? (BOMN, approved, pending) Unit limit (Daily) Auth Start date Expiration date PT/OT + Visit Limit?   2025 BOMN    60 vppy (ends 24)     Date of Service        Visits Used 1 2       Visits Remaining         Patient Education         Medbridge acct created?         PFM anatomy and function 5'        Postpartum rehab TA iso w exhale from PPD 1        SPD modification Avoid/modify asymmetrical movements         Labor and delivery prep Perineal massage nad L&D positions handout provided 30'       Neuro Re-Ed         DB         SEMG  15' sitting & with different labor/birth positions       Ther Ex         TA+exhale Seated, STS, squat        TA strengthening  **                                           Ther Activity           L&D prep    **                                      Manual Ther              PFM exam  nv  **          Ortho exam  completed            Hip mobs  PRN                                                        Modalities

## 2024-12-05 PROBLEM — Z34.93 PRENATAL CARE IN THIRD TRIMESTER: Status: RESOLVED | Noted: 2024-10-10 | Resolved: 2024-12-05

## 2024-12-05 PROBLEM — Z34.93 PRENATAL CARE, THIRD TRIMESTER: Status: RESOLVED | Noted: 2024-10-21 | Resolved: 2024-12-05

## 2024-12-05 PROBLEM — Z3A.35 35 WEEKS GESTATION OF PREGNANCY: Status: ACTIVE | Noted: 2024-06-20

## 2024-12-06 NOTE — ASSESSMENT & PLAN NOTE
Established with Rheum on 10/14/24  Per Rheumatology:   She was diagnosed with psoriasis in 2008 and psoriatic arthritis (knees, lower back, ankles, toe nail pitting) in 2017.   Symptoms currently well controlled but if necessary, she may try DMARD (TNFi Certolizumab pegol which does not pass through placenta and is safe compared to other biologics during pregnancy)  If she experiences a flare up of her psoriatic arthritis - plan to treat with short steroid course and she is agreeable for it.   Will treat her active skin psoriasis with topical steroids and will obtain baseline X-rays of joints once she finishes her pregnancy course.

## 2024-12-06 NOTE — ASSESSMENT & PLAN NOTE
- Continue PNV  - Labor precautions reviewed  - Fetal kick counts reviewed  - Labs: UTD  - Genetics: AFP completed  - Ultrasounds: Most recent US wnl on 11/15/24; Will need position check next week!   - Tdap: Received  - Flu Shot: Received  - RSV: Received    - COVID: Vaccinated; booster encouraged   - Rhogam: N/A  - Delivery: , open to epidural  - Contraception: Declined, options reviewed previously  - Breastfeeding: Yes; pump ordered previously  - Pediatrician: [facility]   - IOL: Interested in 39wk IOL (aware we can schedule at 38 weeks)  - RTO in 1 week

## 2024-12-09 PROBLEM — Z3A.37 37 WEEKS GESTATION OF PREGNANCY: Status: ACTIVE | Noted: 2024-06-20

## 2024-12-09 NOTE — ASSESSMENT & PLAN NOTE
- Continue PNV  - Labor precautions reviewed  - Fetal kick counts reviewed  - Labs: UTD  - Genetics: AFP completed  - Ultrasounds: Most recent US wnl on 11/15/24; Will need position check next week!   - Tdap: Received  - Flu Shot: Received  - RSV: Received    - COVID: Vaccinated; booster encouraged   - Rhogam: N/A  - Delivery: , open to epidural  - Contraception: Declined, options reviewed previously  - Breastfeeding: Yes; pump ordered previously  - Pediatrician: [facility]   - IOL: Interested in 39wk IOL (aware we can schedule at 38 weeks)  -GBS: collected today  - RTO in 1 week

## 2024-12-09 NOTE — PROGRESS NOTES
"OB/GYN  PN Visit  Nicolle Feng  [id number]  12/10/2024  4:43 PM  YOSELIN Valdes    S: 34 y.o.  37w0d here for PN visit. Pregnancy complicated by psoriatic arthritis.     OB complaints:  Denies c/o ha, no edema, no smoking, no DV.   No vb/lof  No cramping/ctxns or signs of PTL.      She reports recent headaches that caused blurry vision/\"stars\" that occurred in the last two weeks. She reports slight edema of hands and feet but not other PIH symptoms. She is currently asymptomatic.    O:    Pre- Vitals      Flowsheet Row Most Recent Value   Prenatal Assessment    Fetal Heart Rate 150   Fundal Height (cm) 37 cm   Movement Present   Presentation Breech  [TUAS today]   Prenatal Vitals    Blood Pressure 100/70   Weight - Scale 81.2 kg (179 lb)   Urine Albumin/Glucose    Dilation/Effacement/Station    Cervical Dilation 2   Cervical Effacement 60   Fetal Station -2   Vaginal Drainage    Edema                       Gen: no acute distress, nonlabored breathing.  OB exam completed: fundal height, +FHT.  Urine: -/-    Problem List Items Addressed This Visit       Psoriatic arthritis (HCC)    37 weeks gestation of pregnancy - Primary    - Continue PNV  - Labor precautions reviewed  - Fetal kick counts reviewed  - Labs: UTD  - Genetics: AFP completed  - Ultrasounds: Most recent US wnl on 11/15/24; Will need position check next week!   - Tdap: Received  - Flu Shot: Received  - RSV: Received    - COVID: Vaccinated; booster encouraged   - Rhogam: N/A  - Delivery: , open to epidural  - Contraception: Declined, options reviewed previously  - Breastfeeding: Yes; pump ordered previously  - Pediatrician: [facility]   - IOL: Interested in 39wk IOL (aware we can schedule at 38 weeks)  -GBS: collected today  - RTO in 1 week         Relevant Orders    Strep B DNA probe, amplification    Comprehensive metabolic panel    CBC and differential    Protein / creatinine ratio, urine    Breech presentation, no version "    -Breech on TUAS today.  -Anterior placenta, reviewed candidacy for ECV. Discussed risks, benefits, and procedures. Counseled patient decreased success and chance of placental abruption requiring emergent delivery.  -She desires trying ECV.  -Message sent to physician team          Other Visit Diagnoses         Pregnancy headache in third trimester        Relevant Orders    Comprehensive metabolic panel    CBC and differential    Protein / creatinine ratio, urine                     YOSELIN Valdes  12/10/2024  4:43 PM

## 2024-12-10 ENCOUNTER — NURSE TRIAGE (OUTPATIENT)
Dept: OTHER | Facility: OTHER | Age: 34
End: 2024-12-10

## 2024-12-10 ENCOUNTER — ROUTINE PRENATAL (OUTPATIENT)
Dept: OBGYN CLINIC | Facility: CLINIC | Age: 34
End: 2024-12-10

## 2024-12-10 VITALS
BODY MASS INDEX: 32.94 KG/M2 | SYSTOLIC BLOOD PRESSURE: 100 MMHG | DIASTOLIC BLOOD PRESSURE: 70 MMHG | HEIGHT: 62 IN | WEIGHT: 179 LBS

## 2024-12-10 DIAGNOSIS — R51.9 PREGNANCY HEADACHE IN THIRD TRIMESTER: ICD-10-CM

## 2024-12-10 DIAGNOSIS — L40.50 PSORIATIC ARTHRITIS (HCC): ICD-10-CM

## 2024-12-10 DIAGNOSIS — Z3A.37 37 WEEKS GESTATION OF PREGNANCY: Primary | ICD-10-CM

## 2024-12-10 DIAGNOSIS — O26.893 PREGNANCY HEADACHE IN THIRD TRIMESTER: ICD-10-CM

## 2024-12-10 PROCEDURE — PNV

## 2024-12-10 PROCEDURE — 87150 DNA/RNA AMPLIFIED PROBE: CPT

## 2024-12-10 NOTE — TELEPHONE ENCOUNTER
"Light spotting. Had cervical check this afternoon. Normal fetal movement. No contractions. Feels well. Advised as per protocol for spotting after cervical exam including monitoring and reasons to call back. Patient verbalized understanding.     Reason for Disposition   Slight spotting after a pelvic examination (brief episode)    Answer Assessment - Initial Assessment Questions  1. ONSET: \"When did this bleeding start?\"         Spotting after cervical check at appointment    2. BLEEDING SEVERITY: \"Describe the bleeding that you are having.\" \"How much bleeding is there?\"       spotting    3. ABDOMEN PAIN: \"Do you have any pain?\" \"How bad is the pain?\"  (e.g., Scale 0-10; none, mild, moderate, or severe)      no    4. PREGNANCY: \"Do you know how many weeks or months pregnant you are?\"       37w0    5. SABRINA: \"What date are you expecting to deliver?\"      12/31/24    6. FETAL MOVEMENT: \"Has the baby's movement decreased or changed significantly from normal?\"      Moving normally    7. HIGH-RISK PREGNANCY:  \"Have been told by your doctor that you have a high-risk condition that can cause bleeding?\"  (e.g., placenta previa, vasa previa)       no    8. ULTRASOUND: \"Have you had an ultrasound during this pregnancy?\"  Note: To confirm intrauterine pregnancy, placenta location.      yes    9. HEMODYNAMIC STATUS: \"Are you weak or feeling lightheaded?\" If Yes, ask: \"Can you stand and walk normally?\"       Feels dizzy or lightheaded    10. OTHER SYMPTOMS: \"What other symptoms are you having with the bleeding?\" (e.g., leaking fluid from vagina, contractions)        That's    Protocols used: Pregnancy - Vaginal Bleeding Greater Than 20 Weeks EGA-Adult-AH    "

## 2024-12-10 NOTE — ASSESSMENT & PLAN NOTE
-Breech on TUAS today.  -Anterior placenta, reviewed candidacy for ECV. Discussed risks, benefits, and procedures. Counseled patient decreased success and chance of placental abruption requiring emergent delivery.  -She desires trying ECV.  -Message sent to physician team

## 2024-12-10 NOTE — TELEPHONE ENCOUNTER
"Regardin weeks pregnant/discharge bloody fluid  ----- Message from Madie HERRERA sent at 12/10/2024  6:05 PM EST -----  Pt stated, \"I am 37 weeks pregnant and just had bloody fluid come out of me. When I was last checked I was 2 cm.\"    "

## 2024-12-11 ENCOUNTER — TELEPHONE (OUTPATIENT)
Dept: OBGYN CLINIC | Facility: CLINIC | Age: 34
End: 2024-12-11

## 2024-12-11 ENCOUNTER — APPOINTMENT (OUTPATIENT)
Dept: PHYSICAL THERAPY | Facility: REHABILITATION | Age: 34
End: 2024-12-11
Payer: COMMERCIAL

## 2024-12-11 ENCOUNTER — APPOINTMENT (OUTPATIENT)
Dept: LAB | Facility: CLINIC | Age: 34
End: 2024-12-11
Payer: COMMERCIAL

## 2024-12-11 ENCOUNTER — TELEPHONE (OUTPATIENT)
Dept: LABOR AND DELIVERY | Facility: HOSPITAL | Age: 34
End: 2024-12-11

## 2024-12-11 DIAGNOSIS — Z79.899 HIGH RISK MEDICATION USE: ICD-10-CM

## 2024-12-11 DIAGNOSIS — L40.50 PSORIATIC ARTHRITIS (HCC): ICD-10-CM

## 2024-12-11 DIAGNOSIS — R51.9 PREGNANCY HEADACHE IN THIRD TRIMESTER: ICD-10-CM

## 2024-12-11 DIAGNOSIS — Z3A.37 37 WEEKS GESTATION OF PREGNANCY: ICD-10-CM

## 2024-12-11 DIAGNOSIS — O26.893 PREGNANCY HEADACHE IN THIRD TRIMESTER: ICD-10-CM

## 2024-12-11 LAB
ALBUMIN SERPL BCG-MCNC: 3.6 G/DL (ref 3.5–5)
ALP SERPL-CCNC: 117 U/L (ref 34–104)
ALT SERPL W P-5'-P-CCNC: 21 U/L (ref 7–52)
ANION GAP SERPL CALCULATED.3IONS-SCNC: 9 MMOL/L (ref 4–13)
ANISOCYTOSIS BLD QL SMEAR: PRESENT
AST SERPL W P-5'-P-CCNC: 22 U/L (ref 13–39)
BASOPHILS # BLD MANUAL: 0 THOUSAND/UL (ref 0–0.1)
BASOPHILS NFR MAR MANUAL: 0 % (ref 0–1)
BILIRUB SERPL-MCNC: 0.4 MG/DL (ref 0.2–1)
BUN SERPL-MCNC: 6 MG/DL (ref 5–25)
CALCIUM SERPL-MCNC: 9 MG/DL (ref 8.4–10.2)
CHLORIDE SERPL-SCNC: 101 MMOL/L (ref 96–108)
CO2 SERPL-SCNC: 26 MMOL/L (ref 21–32)
CREAT SERPL-MCNC: 0.51 MG/DL (ref 0.6–1.3)
CREAT UR-MCNC: 35.6 MG/DL
EOSINOPHIL # BLD MANUAL: 0 THOUSAND/UL (ref 0–0.4)
EOSINOPHIL NFR BLD MANUAL: 0 % (ref 0–6)
ERYTHROCYTE [DISTWIDTH] IN BLOOD BY AUTOMATED COUNT: 13.5 % (ref 11.6–15.1)
GFR SERPL CREATININE-BSD FRML MDRD: 127 ML/MIN/1.73SQ M
GLUCOSE P FAST SERPL-MCNC: 67 MG/DL (ref 65–99)
HCT VFR BLD AUTO: 40.3 % (ref 34.8–46.1)
HGB BLD-MCNC: 12.9 G/DL (ref 11.5–15.4)
LYMPHOCYTES # BLD AUTO: 1.67 THOUSAND/UL (ref 0.6–4.47)
LYMPHOCYTES # BLD AUTO: 12 % (ref 14–44)
MCH RBC QN AUTO: 30.9 PG (ref 26.8–34.3)
MCHC RBC AUTO-ENTMCNC: 32 G/DL (ref 31.4–37.4)
MCV RBC AUTO: 97 FL (ref 82–98)
MONOCYTES # BLD AUTO: 0.24 THOUSAND/UL (ref 0–1.22)
MONOCYTES NFR BLD: 2 % (ref 4–12)
NEUTROPHILS # BLD MANUAL: 10.02 THOUSAND/UL (ref 1.85–7.62)
NEUTS SEG NFR BLD AUTO: 84 % (ref 43–75)
PLATELET # BLD AUTO: 252 THOUSANDS/UL (ref 149–390)
PLATELET BLD QL SMEAR: ADEQUATE
PMV BLD AUTO: 10 FL (ref 8.9–12.7)
POTASSIUM SERPL-SCNC: 3.9 MMOL/L (ref 3.5–5.3)
PROT SERPL-MCNC: 6.5 G/DL (ref 6.4–8.4)
PROT UR-MCNC: 19.4 MG/DL
PROT/CREAT UR: 0.5 MG/G{CREAT} (ref 0–0.1)
RBC # BLD AUTO: 4.17 MILLION/UL (ref 3.81–5.12)
RBC MORPH BLD: PRESENT
SODIUM SERPL-SCNC: 136 MMOL/L (ref 135–147)
VARIANT LYMPHS # BLD AUTO: 2 %
WBC # BLD AUTO: 11.93 THOUSAND/UL (ref 4.31–10.16)

## 2024-12-11 PROCEDURE — 85027 COMPLETE CBC AUTOMATED: CPT

## 2024-12-11 PROCEDURE — 82570 ASSAY OF URINE CREATININE: CPT

## 2024-12-11 PROCEDURE — 85007 BL SMEAR W/DIFF WBC COUNT: CPT

## 2024-12-11 PROCEDURE — 36415 COLL VENOUS BLD VENIPUNCTURE: CPT

## 2024-12-11 PROCEDURE — 84156 ASSAY OF PROTEIN URINE: CPT

## 2024-12-11 PROCEDURE — 80053 COMPREHEN METABOLIC PANEL: CPT

## 2024-12-11 NOTE — TELEPHONE ENCOUNTER
Left non-detailed message patient's VM re: date/time ECV & also to follow up with availability for scheduling C/S - no availability 12/27/2024 or 12/30/2024 - there is availability on 12/26/2024 @ 1300.

## 2024-12-11 NOTE — TELEPHONE ENCOUNTER
----- Message from YOSELIN Bui sent at 12/11/2024  8:26 AM EST -----  Regarding: FW: ECV?  Please call patient to schedule ECV with Dr. Sahu this Friday. Thank you!  ----- Message -----  From: Cailin Sahu MD  Sent: 12/10/2024   4:56 PM EST  To: Ca Stern MD; YOSELIN Valdes; #  Subject: RE: ECV?                                         I'm willing to try--I'm on call Friday and laborist so will be there all day  ----- Message -----  From: YOSELIN Valdes  Sent: 12/10/2024   4:41 PM EST  To: Ca Stern MD; Taylor Scott MD; #  Subject: ECV?                                             Hi Team,    Nicolle Feng is one of our OB patients at 37w0d. She has no significant concerns during this pregnancy. No GDM. She is breech by TUAS today.    She does have an anterior placenta but desires ECV. Is anyone open to trying the ECV? She is aware of risks and reviewed less chance of success with anterior placenta.    EFW at 32 weeks:2318 grams - 5 lbs 2 oz(62%)    Leticia

## 2024-12-11 NOTE — TELEPHONE ENCOUNTER
----- Message from YOSELIN Bui sent at 12/10/2024  4:30 PM EST -----  Regarding: LTCS for breech  Please call patient to schedule primary LTCS due to breech between 39-40 weeks.    Leticia

## 2024-12-11 NOTE — TELEPHONE ENCOUNTER
----- Message from YOSELIN Bui sent at 12/11/2024  8:26 AM EST -----  Regarding: FW: ECV?  Please call patient to schedule ECV with Dr. Sahu this Friday. Thank you!  ----- Message -----  From: Cailin Sahu MD  Sent: 12/10/2024   4:56 PM EST  To: Ca Stern MD; YOSELIN Valdes; #  Subject: RE: ECV?                                         I'm willing to try--I'm on call Friday and laborist so will be there all day  ----- Message -----  From: YOSELIN Valdes  Sent: 12/10/2024   4:41 PM EST  To: aC Stern MD; Taylor Scott MD; #  Subject: ECV?                                             Hi Team,    Nicolle Feng is one of our OB patients at 37w0d. She has no significant concerns during this pregnancy. No GDM. She is breech by TUAS today.    She does have an anterior placenta but desires ECV. Is anyone open to trying the ECV? She is aware of risks and reviewed less chance of success with anterior placenta.    EFW at 32 weeks:2318 grams - 5 lbs 2 oz(62%)    Leticia

## 2024-12-11 NOTE — TELEPHONE ENCOUNTER
[employer] message sent to patient to follow up if has preference for C/S date between 12/24/2024 - 12/31/2024.

## 2024-12-11 NOTE — TELEPHONE ENCOUNTER
Spoke with ABIGAIL Smart who also spoke with patient - will plan ECV on 12/19/2024 (Dr Sahu on call) if available & C/S on 12/26/2024 @1300 if still available.  ESC message sent to Blayne HAYES (Satnam REY).  Awaiting response.

## 2024-12-11 NOTE — TELEPHONE ENCOUNTER
Patient informed of availability for C/S - also inquired with Blayne TREVINO & L&D if 12/31/2024 is available for C/S per patient request.  Patient is hesitant re: ECV & now would like to cancel - ESC message sent to Blayne HAYSE. Staff message sent to YOSELIN Mendoza to contact patient re: further questions re: version & C/S.

## 2024-12-11 NOTE — TELEPHONE ENCOUNTER
Patient scheduled for ECV on 12/19/2024 @ 1000 (Dr Sahu on call) & C/S scheduled for 12/26/2024 @ 1300.  [facility] sticky note updated.  Patient informed.  Staff message sent to Dr Sahu re: ECV & staff message sent to Dr Pascal re: C/S.

## 2024-12-11 NOTE — TELEPHONE ENCOUNTER
Patient scheduled for ECV on 12/13/2024 @ 0800 (Dr Sahu on call) with Blayne HAYES via ESC.  Patient informed.  [facility] sticky note updated.  Also ESC message sent to Blayne HAEYS. re: availability for primary C/S 12/27/2024 (patient preference).

## 2024-12-11 NOTE — TELEPHONE ENCOUNTER
Called patient to review questions regarding ECV.   Discussed risks and benefits of an ECV. Reviewed higher potential to fail ECV due to anterior placenta and position of baby. Reviewed risks of ECV, including placental abruption, fetal distress, and need for emergency LTCS. Counseled patient how the procedure is done along with monitoring post-procedure.   Patient is nervous regarding risks for ECV; she desires to schedule but wants more time to think about it.   We also reviewed scheduling a LTCS to have booked. She is also nervous about going close to her due date since she is dilated. Reviewed labor precautions and LOF; counseled that if medically indicated such as labor, a CS would be advised at that time.

## 2024-12-12 ENCOUNTER — RESULTS FOLLOW-UP (OUTPATIENT)
Dept: OBGYN CLINIC | Facility: CLINIC | Age: 34
End: 2024-12-12

## 2024-12-12 ENCOUNTER — PATIENT MESSAGE (OUTPATIENT)
Dept: OBGYN CLINIC | Facility: CLINIC | Age: 34
End: 2024-12-12

## 2024-12-12 LAB — GP B STREP DNA SPEC QL NAA+PROBE: NEGATIVE

## 2024-12-12 NOTE — PATIENT COMMUNICATION
Placed call to patient.   Reviewed results and provider's recommendations ( see result notes for further documentation ).  Reviewed provider's recommendations regarding work as requested.  Reviewed and scheduled NSTs with OB visits in office.  Confirmed ECV 12/13/24 cancelled, and c/s as scheduled 12/26/24. ESC sent to [facility] to make aware of cancelled ECV 12/13/24 and to confirm CFW provider as surgeon for previously scheduled C/S 12/26/24.   Patient verbalized understanding of s/sx to monitor for, when to call office, and had no additional questions or concerns at this time.

## 2024-12-16 ENCOUNTER — ROUTINE PRENATAL (OUTPATIENT)
Dept: OBGYN CLINIC | Facility: CLINIC | Age: 34
End: 2024-12-16
Payer: COMMERCIAL

## 2024-12-16 ENCOUNTER — CLINICAL SUPPORT (OUTPATIENT)
Dept: POSTPARTUM | Facility: CLINIC | Age: 34
End: 2024-12-16

## 2024-12-16 DIAGNOSIS — M25.559 PREGNANCY RELATED HIP PAIN IN THIRD TRIMESTER, ANTEPARTUM: ICD-10-CM

## 2024-12-16 DIAGNOSIS — Z3A.37 37 WEEKS GESTATION OF PREGNANCY: ICD-10-CM

## 2024-12-16 DIAGNOSIS — O26.893 PREGNANCY RELATED HIP PAIN IN THIRD TRIMESTER, ANTEPARTUM: ICD-10-CM

## 2024-12-16 DIAGNOSIS — L40.50 PSORIATIC ARTHRITIS (HCC): ICD-10-CM

## 2024-12-16 DIAGNOSIS — Z32.2 ENCOUNTER FOR CHILDBIRTH INSTRUCTION: Primary | ICD-10-CM

## 2024-12-16 PROCEDURE — 59025 FETAL NON-STRESS TEST: CPT | Performed by: OBSTETRICS & GYNECOLOGY

## 2024-12-16 PROCEDURE — PNV: Performed by: OBSTETRICS & GYNECOLOGY

## 2024-12-16 NOTE — ASSESSMENT & PLAN NOTE
Scheduled for ECV with Dr. Sahu on 12/19/24 - Canceled (patient does not wish to proceed with ECV- staff message sent)   Scheduled for 1LTCS with Dr. Pascal on 12/26/24

## 2024-12-16 NOTE — PROGRESS NOTES
OB/GYN  PN Visit  Nicolle Feng  [id number]  2024  12:56 PM  Dr. Cass Martinez MD    S: 34 y.o.  37w6d here for PN visit. She reports occasional cramping. She denies leakage of fluid and vaginal bleeding. She reports good fetal movement. She denies nausea, vomiting, domestic violence, and smoking. She reports occasional headaches and bilateral upper and lower extremity edema. Her pregnancy is complicated by psoriatic arthritis and breech presentation     O:    There were no vitals filed for this visit.  112/60  78      Physical Exam  Vitals reviewed.   Constitutional:       General: She is not in acute distress.     Appearance: Normal appearance. She is well-developed. She is not ill-appearing, toxic-appearing or diaphoretic.   Cardiovascular:      Rate and Rhythm: Normal rate.   Pulmonary:      Effort: Pulmonary effort is normal. No respiratory distress.   Abdominal:      General: There is no distension.      Palpations: Abdomen is soft. There is no mass.      Tenderness: There is no abdominal tenderness. There is no guarding or rebound.   Genitourinary:     Comments: Gravid, nontender  Skin:     General: Skin is warm and dry.   Neurological:      Mental Status: She is alert and oriented to person, place, and time.   Psychiatric:         Mood and Affect: Mood normal.         Behavior: Behavior normal.         A/P:    Assessment & Plan  37 weeks gestation of pregnancy  - Continue PNV  - Labor precautions reviewed  - Fetal kick counts reviewed  - Labs: UTD  - Genetics: AFP completed  - Ultrasounds: Most recent US wnl on 11/15/24; Confirmed breech again today  - Tdap: Received  - Flu Shot: Received  - RSV: Received    - COVID: Vaccinated; booster encouraged   - Rhogam: N/A  - Delivery: , open to epidural -> Scheduled for 1TLCS due to breech presentation  - Contraception: Declined, options reviewed previously  - Breastfeeding: Yes; pump ordered previously  - Pediatrician: [facility]   -  RTO in 1 week       Psoriatic arthritis (HCC)  Established with Rheum on 10/14/24  Per Rheumatology:   She was diagnosed with psoriasis in 2008 and psoriatic arthritis (knees, lower back, ankles, toe nail pitting) in 2017.   Symptoms currently well controlled but if necessary, she may try DMARD (TNFi Certolizumab pegol which does not pass through placenta and is safe compared to other biologics during pregnancy)  If she experiences a flare up of her psoriatic arthritis - plan to treat with short steroid course and she is agreeable for it.   Will treat her active skin psoriasis with topical steroids and will obtain baseline X-rays of joints once she finishes her pregnancy course.       Breech presentation, single or unspecified fetus  Scheduled for ECV with Dr. Sahu on 12/19/24 - Canceled (patient does not wish to proceed with ECV- staff message sent)   Scheduled for 1LTCS with Dr. Pascal on 12/26/24             Future Appointments   Date Time Provider Department Center   12/18/2024  3:15 PM Estrellita Tobin, PT BE PT 3 NEW BE 3rd & NEW   12/24/2024  9:15 AM NST [facility]   12/24/2024 10:00 AM Ca Stern MD [facility]   12/31/2024  8:45 AM Cass Martinez MD [facility]   1/6/2025  1:45 PM Croa Pascal MD [facility] BE Practice-Wom   2/17/2025  1:15 PM nAdre Drake MD [facility] 8[facility]         Cass Martinez MD  12/16/2024  12:56 PM

## 2024-12-16 NOTE — ASSESSMENT & PLAN NOTE
- Continue PNV  - Labor precautions reviewed  - Fetal kick counts reviewed  - Labs: UTD  - Genetics: AFP completed  - Ultrasounds: Most recent US wnl on 11/15/24; Confirmed breech again today  - Tdap: Received  - Flu Shot: Received  - RSV: Received    - COVID: Vaccinated; booster encouraged   - Rhogam: N/A  - Delivery: , open to epidural -> Scheduled for 1TLCS due to breech presentation  - Contraception: Declined, options reviewed previously  - Breastfeeding: Yes; pump ordered previously  - Pediatrician: [facility]   - RTO in 1 week

## 2024-12-17 ENCOUNTER — TELEPHONE (OUTPATIENT)
Dept: OBGYN CLINIC | Facility: CLINIC | Age: 34
End: 2024-12-17

## 2024-12-17 NOTE — TELEPHONE ENCOUNTER
Lm patient's as to confirm that she wishes to cancel version (is scheduled with Dr Sahu on 12/19/2024 @ 1000).

## 2024-12-17 NOTE — TELEPHONE ENCOUNTER
----- Message from Cass Martinez MD sent at 12/17/2024 12:50 PM EST -----  Regarding: Cancel ECV  Patient does not wish to proceed with ECV with Dr. Sahu. She will keep the C/S as scheduled with Dr. Pascal. Thanks!

## 2024-12-17 NOTE — TELEPHONE ENCOUNTER
Confirmed with patient that she wishes to cancel ECV. ESC message sent to [facility].  [place] sticky note updated.  Staff message sent to Dr Sahu.

## 2024-12-17 NOTE — PROGRESS NOTES
12/16/24 1532   NST Start Time    NST Start Time  1532   Nonstress Test   Reason for NST Other Obstetric Risk Factors   Other Obstetric Risk Factors   (Proteinuria without hypertension)   Variability 6-25 BPM   Maternal Pulse 78   Decelerations None   Accelerations Yes   Baseline 130 BPM   Uterine Irritability Yes   Contractions Regular   Contraction Frequency (minutes) 4 min   Interpretation   Nonstress Test Interpretation Reactive   Overall Impression Reassuring   OTHER   NST End Time  1603       Cass Long MD  OB/GYN  She/her  12/16/24

## 2024-12-17 NOTE — TELEPHONE ENCOUNTER
Patient returned call & will plan C/S @ 0800 on 12/26/2024.  [place] sticky note updated & Staff message sent to Dr Pascal.

## 2024-12-17 NOTE — TELEPHONE ENCOUNTER
Lm patient's VM re: change in time for C/S scheduled for 12/26/2024 to 0800 ( was scheduled 12/26/2024 @ 1300).  Dr Pascal has 2 cases same days & requested earlier time.  Time change made with Keke QUINN ([facility] L&D).  Also sent patient MyChart message to confirm if ok with her.

## 2024-12-23 NOTE — PRE-PROCEDURE INSTRUCTIONS
Phone call to patient for pre-operative instructions prior to .   Pt was instructed to arrive 2 hours before her scheduled OR time (2.5 hours if Rh neg)     Pt instructed to remain NPO after midnight.              *This includes gum, water and hard candy.  *This should not include medications like prenatal vitamins, aspirin, or colace.   Pt should brush their teeth as usual.     Pt was instructed to buy and use a pre-surgical wash containing chlorhexidine the night before and the morning of her scheduled  and to wear clean clothing after the wash.  Pt instructed not to shave operative area prior to surgery.     Pt was asked not to wear any jewelry and to leave all of her valuables at home.     Pt was asked to leave all of her larger bags and suitcases in the car to be brought in after she is assigned a post partum room.  Pt should bring in any paperwork she was given in the office.     Pt was informed that she may have 1 support person in the OR/PACU area and of the current visiting policies. Support person should eat prior to the surgery.

## 2024-12-24 ENCOUNTER — ROUTINE PRENATAL (OUTPATIENT)
Dept: OBGYN CLINIC | Facility: CLINIC | Age: 34
End: 2024-12-24
Payer: COMMERCIAL

## 2024-12-24 VITALS
SYSTOLIC BLOOD PRESSURE: 106 MMHG | BODY MASS INDEX: 32.56 KG/M2 | SYSTOLIC BLOOD PRESSURE: 106 MMHG | DIASTOLIC BLOOD PRESSURE: 70 MMHG | WEIGHT: 178 LBS | BODY MASS INDEX: 32.56 KG/M2 | WEIGHT: 178 LBS | DIASTOLIC BLOOD PRESSURE: 70 MMHG

## 2024-12-24 DIAGNOSIS — Z34.93 PRENATAL CARE IN THIRD TRIMESTER: Primary | ICD-10-CM

## 2024-12-24 PROCEDURE — 59025 FETAL NON-STRESS TEST: CPT | Performed by: OBSTETRICS & GYNECOLOGY

## 2024-12-24 PROCEDURE — PNV: Performed by: OBSTETRICS & GYNECOLOGY

## 2024-12-24 NOTE — PROGRESS NOTES
Patient reports good fm, no n/v, headache,  bleeding, loss of fluid,  dom violence, or smoking.  roro pnv some cramping and edema  - with moderate variability with accelerations toco rare contraction less than 20-minute  Assessment & Plan  Prenatal care in third trimester  - Continue PNV  - Labor precautions reviewed  - Fetal kick counts reviewed  - Labs: UTD  - Genetics: AFP completed  - Ultrasounds: Most recent US wnl on 11/15/24; Confirmed breech again today  - Tdap: Received  - Flu Shot: Received  - RSV: Received    - COVID: Vaccinated; booster encouraged   - Rhogam: N/A  - Delivery: Scheduled for 1TLCS due to breech presentation  - Contraception: Declined, options reviewed previously  - Breastfeeding: Yes; pump ordered previously  - Pediatrician: [facility]   -Given wash and booklet reviewed instructions scheduled for  for breech

## 2024-12-26 ENCOUNTER — ANESTHESIA EVENT (INPATIENT)
Dept: LABOR AND DELIVERY | Facility: HOSPITAL | Age: 34
End: 2024-12-26
Payer: COMMERCIAL

## 2024-12-26 ENCOUNTER — HOSPITAL ENCOUNTER (INPATIENT)
Facility: HOSPITAL | Age: 34
LOS: 3 days | Discharge: HOME | End: 2024-12-29
Attending: STUDENT IN AN ORGANIZED HEALTH CARE EDUCATION/TRAINING PROGRAM | Admitting: STUDENT IN AN ORGANIZED HEALTH CARE EDUCATION/TRAINING PROGRAM
Payer: COMMERCIAL

## 2024-12-26 ENCOUNTER — ANESTHESIA (INPATIENT)
Dept: LABOR AND DELIVERY | Facility: HOSPITAL | Age: 34
End: 2024-12-26
Payer: COMMERCIAL

## 2024-12-26 DIAGNOSIS — Z98.891 STATUS POST PRIMARY LOW TRANSVERSE CESAREAN SECTION: Chronic | ICD-10-CM

## 2024-12-26 DIAGNOSIS — Z3A.37 37 WEEKS GESTATION OF PREGNANCY: Primary | ICD-10-CM

## 2024-12-26 DIAGNOSIS — Z41.9 PATIENT-REQUESTED PROCEDURE: ICD-10-CM

## 2024-12-26 PROBLEM — Z3A.39 39 WEEKS GESTATION OF PREGNANCY: Status: ACTIVE | Noted: 2024-06-20

## 2024-12-26 PROBLEM — Z3A.39 39 WEEKS GESTATION OF PREGNANCY: Chronic | Status: ACTIVE | Noted: 2024-06-20

## 2024-12-26 LAB
ABO GROUP BLD: NORMAL
ABO GROUP BLD: NORMAL
BASE EXCESS BLDCOA CALC-SCNC: -4.5 MMOL/L (ref 3–11)
BASE EXCESS BLDCOA CALC-SCNC: -4.5 MMOL/L (ref 3–11)
BASE EXCESS BLDCOV CALC-SCNC: -3.3 MMOL/L (ref 1–9)
BASE EXCESS BLDCOV CALC-SCNC: -3.3 MMOL/L (ref 1–9)
BLD GP AB SCN SERPL QL: NEGATIVE
BLD GP AB SCN SERPL QL: NEGATIVE
ERYTHROCYTE [DISTWIDTH] IN BLOOD BY AUTOMATED COUNT: 13.3 % (ref 11.6–15.1)
ERYTHROCYTE [DISTWIDTH] IN BLOOD BY AUTOMATED COUNT: 13.3 % (ref 11.6–15.1)
HCO3 BLDCOA-SCNC: 24.9 MMOL/L (ref 17.3–27.3)
HCO3 BLDCOA-SCNC: 24.9 MMOL/L (ref 17.3–27.3)
HCO3 BLDCOV-SCNC: 25.9 MMOL/L (ref 12.2–28.6)
HCO3 BLDCOV-SCNC: 25.9 MMOL/L (ref 12.2–28.6)
HCT VFR BLD AUTO: 36.3 % (ref 34.8–46.1)
HCT VFR BLD AUTO: 36.3 % (ref 34.8–46.1)
HGB BLD-MCNC: 12.1 G/DL (ref 11.5–15.4)
HGB BLD-MCNC: 12.1 G/DL (ref 11.5–15.4)
HOLD SPECIMEN: NORMAL
HOLD SPECIMEN: NORMAL
MCH RBC QN AUTO: 30.7 PG (ref 26.8–34.3)
MCH RBC QN AUTO: 30.7 PG (ref 26.8–34.3)
MCHC RBC AUTO-ENTMCNC: 33.3 G/DL (ref 31.4–37.4)
MCHC RBC AUTO-ENTMCNC: 33.3 G/DL (ref 31.4–37.4)
MCV RBC AUTO: 92 FL (ref 82–98)
MCV RBC AUTO: 92 FL (ref 82–98)
O2 CT VFR BLDCOA CALC: 5.5 ML/DL
O2 CT VFR BLDCOA CALC: 5.5 ML/DL
OXYHGB MFR BLDCOA: 25.9 %
OXYHGB MFR BLDCOA: 25.9 %
OXYHGB MFR BLDCOV: 31.9 %
OXYHGB MFR BLDCOV: 31.9 %
PCO2 BLDCOA: 65.6 MM[HG] (ref 30–60)
PCO2 BLDCOA: 65.6 MM[HG] (ref 30–60)
PCO2 BLDCOV: 65.3 MM HG (ref 27–43)
PCO2 BLDCOV: 65.3 MM HG (ref 27–43)
PH BLDCOA: 7.2 [PH] (ref 7.23–7.43)
PH BLDCOA: 7.2 [PH] (ref 7.23–7.43)
PH BLDCOV: 7.22 [PH] (ref 7.19–7.49)
PH BLDCOV: 7.22 [PH] (ref 7.19–7.49)
PLATELET # BLD AUTO: 220 THOUSANDS/UL (ref 149–390)
PLATELET # BLD AUTO: 220 THOUSANDS/UL (ref 149–390)
PMV BLD AUTO: 9.7 FL (ref 8.9–12.7)
PMV BLD AUTO: 9.7 FL (ref 8.9–12.7)
PO2 BLDCOA: 14.8 MM HG (ref 5–25)
PO2 BLDCOA: 14.8 MM HG (ref 5–25)
PO2 BLDCOV: 17.1 MM HG (ref 15–45)
PO2 BLDCOV: 17.1 MM HG (ref 15–45)
RBC # BLD AUTO: 3.94 MILLION/UL (ref 3.81–5.12)
RBC # BLD AUTO: 3.94 MILLION/UL (ref 3.81–5.12)
RH BLD: POSITIVE
RH BLD: POSITIVE
SAO2 % BLDCOV: 6.6 ML/DL
SAO2 % BLDCOV: 6.6 ML/DL
SPECIMEN EXPIRATION DATE: NORMAL
SPECIMEN EXPIRATION DATE: NORMAL
TREPONEMA PALLIDUM IGG+IGM AB [PRESENCE] IN SERUM OR PLASMA BY IMMUNOASSAY: NORMAL
TREPONEMA PALLIDUM IGG+IGM AB [PRESENCE] IN SERUM OR PLASMA BY IMMUNOASSAY: NORMAL
WBC # BLD AUTO: 10.37 THOUSAND/UL (ref 4.31–10.16)
WBC # BLD AUTO: 10.37 THOUSAND/UL (ref 4.31–10.16)

## 2024-12-26 PROCEDURE — 86850 RBC ANTIBODY SCREEN: CPT

## 2024-12-26 PROCEDURE — 86901 BLOOD TYPING SEROLOGIC RH(D): CPT

## 2024-12-26 PROCEDURE — 86780 TREPONEMA PALLIDUM: CPT

## 2024-12-26 PROCEDURE — 59510 CESAREAN DELIVERY: CPT | Performed by: STUDENT IN AN ORGANIZED HEALTH CARE EDUCATION/TRAINING PROGRAM

## 2024-12-26 PROCEDURE — 86900 BLOOD TYPING SEROLOGIC ABO: CPT

## 2024-12-26 PROCEDURE — 85027 COMPLETE CBC AUTOMATED: CPT

## 2024-12-26 PROCEDURE — 82805 BLOOD GASES W/O2 SATURATION: CPT | Performed by: STUDENT IN AN ORGANIZED HEALTH CARE EDUCATION/TRAINING PROGRAM

## 2024-12-26 PROCEDURE — NC001 PR NO CHARGE: Performed by: STUDENT IN AN ORGANIZED HEALTH CARE EDUCATION/TRAINING PROGRAM

## 2024-12-26 RX ORDER — DIPHENHYDRAMINE HYDROCHLORIDE 50 MG/ML
12.5 INJECTION INTRAMUSCULAR; INTRAVENOUS ONCE AS NEEDED
Status: DISCONTINUED | OUTPATIENT
Start: 2024-12-26 | End: 2024-12-29 | Stop reason: HOSPADM

## 2024-12-26 RX ORDER — MORPHINE SULFATE 0.5 MG/ML
INJECTION, SOLUTION EPIDURAL; INTRATHECAL; INTRAVENOUS AS NEEDED
Status: DISCONTINUED | OUTPATIENT
Start: 2024-12-26 | End: 2024-12-26

## 2024-12-26 RX ORDER — IBUPROFEN 600 MG/1
600 TABLET, FILM COATED ORAL EVERY 6 HOURS
Status: DISCONTINUED | OUTPATIENT
Start: 2024-12-27 | End: 2024-12-29 | Stop reason: HOSPADM

## 2024-12-26 RX ORDER — OXYTOCIN/RINGER'S LACTATE 30/500 ML
PLASTIC BAG, INJECTION (ML) INTRAVENOUS CONTINUOUS PRN
Status: DISCONTINUED | OUTPATIENT
Start: 2024-12-26 | End: 2024-12-26

## 2024-12-26 RX ORDER — MEPERIDINE HYDROCHLORIDE 25 MG/ML
12.5 INJECTION INTRAMUSCULAR; INTRAVENOUS; SUBCUTANEOUS
Status: DISCONTINUED | OUTPATIENT
Start: 2024-12-26 | End: 2024-12-26

## 2024-12-26 RX ORDER — ACETAMINOPHEN 325 MG/1
650 TABLET ORAL EVERY 6 HOURS SCHEDULED
Status: DISCONTINUED | OUTPATIENT
Start: 2024-12-26 | End: 2024-12-29 | Stop reason: HOSPADM

## 2024-12-26 RX ORDER — CEFAZOLIN SODIUM 2 G/50ML
2000 SOLUTION INTRAVENOUS ONCE
Status: COMPLETED | OUTPATIENT
Start: 2024-12-26 | End: 2024-12-26

## 2024-12-26 RX ORDER — SODIUM CHLORIDE, SODIUM LACTATE, POTASSIUM CHLORIDE, CALCIUM CHLORIDE 600; 310; 30; 20 MG/100ML; MG/100ML; MG/100ML; MG/100ML
125 INJECTION, SOLUTION INTRAVENOUS CONTINUOUS
Status: DISCONTINUED | OUTPATIENT
Start: 2024-12-26 | End: 2024-12-26

## 2024-12-26 RX ORDER — KETOROLAC TROMETHAMINE 30 MG/ML
INJECTION, SOLUTION INTRAMUSCULAR; INTRAVENOUS AS NEEDED
Status: DISCONTINUED | OUTPATIENT
Start: 2024-12-26 | End: 2024-12-26

## 2024-12-26 RX ORDER — GLYCOPYRROLATE 0.2 MG/ML
INJECTION INTRAMUSCULAR; INTRAVENOUS AS NEEDED
Status: DISCONTINUED | OUTPATIENT
Start: 2024-12-26 | End: 2024-12-26

## 2024-12-26 RX ORDER — CALCIUM CARBONATE 500 MG/1
1000 TABLET, CHEWABLE ORAL 3 TIMES DAILY PRN
Status: DISCONTINUED | OUTPATIENT
Start: 2024-12-26 | End: 2024-12-29 | Stop reason: HOSPADM

## 2024-12-26 RX ORDER — HYDROMORPHONE HCL/PF 1 MG/ML
0.5 SYRINGE (ML) INJECTION
Status: DISCONTINUED | OUTPATIENT
Start: 2024-12-26 | End: 2024-12-26

## 2024-12-26 RX ORDER — DEXAMETHASONE SODIUM PHOSPHATE 10 MG/ML
INJECTION, SOLUTION INTRAMUSCULAR; INTRAVENOUS AS NEEDED
Status: DISCONTINUED | OUTPATIENT
Start: 2024-12-26 | End: 2024-12-26

## 2024-12-26 RX ORDER — OXYCODONE HYDROCHLORIDE 5 MG/1
5 TABLET ORAL EVERY 4 HOURS PRN
Refills: 0 | Status: CANCELLED | OUTPATIENT
Start: 2024-12-26

## 2024-12-26 RX ORDER — KETOROLAC TROMETHAMINE 30 MG/ML
15 INJECTION, SOLUTION INTRAMUSCULAR; INTRAVENOUS EVERY 6 HOURS SCHEDULED
Status: COMPLETED | OUTPATIENT
Start: 2024-12-26 | End: 2024-12-27

## 2024-12-26 RX ORDER — BENZOCAINE/MENTHOL 6 MG-10 MG
1 LOZENGE MUCOUS MEMBRANE DAILY PRN
Status: DISCONTINUED | OUTPATIENT
Start: 2024-12-26 | End: 2024-12-29 | Stop reason: HOSPADM

## 2024-12-26 RX ORDER — SIMETHICONE 80 MG
80 TABLET,CHEWABLE ORAL 4 TIMES DAILY PRN
Status: DISCONTINUED | OUTPATIENT
Start: 2024-12-26 | End: 2024-12-29 | Stop reason: HOSPADM

## 2024-12-26 RX ORDER — OXYTOCIN/RINGER'S LACTATE 30/500 ML
62.5 PLASTIC BAG, INJECTION (ML) INTRAVENOUS ONCE
Status: COMPLETED | OUTPATIENT
Start: 2024-12-26 | End: 2024-12-26

## 2024-12-26 RX ORDER — DOCUSATE SODIUM 100 MG/1
100 CAPSULE, LIQUID FILLED ORAL 2 TIMES DAILY
Status: DISCONTINUED | OUTPATIENT
Start: 2024-12-26 | End: 2024-12-29 | Stop reason: HOSPADM

## 2024-12-26 RX ORDER — ONDANSETRON 2 MG/ML
4 INJECTION INTRAMUSCULAR; INTRAVENOUS ONCE AS NEEDED
Status: COMPLETED | OUTPATIENT
Start: 2024-12-26 | End: 2024-12-26

## 2024-12-26 RX ORDER — ONDANSETRON 2 MG/ML
INJECTION INTRAMUSCULAR; INTRAVENOUS AS NEEDED
Status: DISCONTINUED | OUTPATIENT
Start: 2024-12-26 | End: 2024-12-26

## 2024-12-26 RX ORDER — FENTANYL CITRATE 50 UG/ML
INJECTION, SOLUTION INTRAMUSCULAR; INTRAVENOUS AS NEEDED
Status: DISCONTINUED | OUTPATIENT
Start: 2024-12-26 | End: 2024-12-26

## 2024-12-26 RX ORDER — FENTANYL CITRATE/PF 50 MCG/ML
25 SYRINGE (ML) INJECTION
Status: DISCONTINUED | OUTPATIENT
Start: 2024-12-26 | End: 2024-12-26

## 2024-12-26 RX ORDER — DIPHENHYDRAMINE HYDROCHLORIDE 50 MG/ML
25 INJECTION INTRAMUSCULAR; INTRAVENOUS EVERY 6 HOURS PRN
Status: DISCONTINUED | OUTPATIENT
Start: 2024-12-26 | End: 2024-12-29 | Stop reason: HOSPADM

## 2024-12-26 RX ORDER — BUPIVACAINE HYDROCHLORIDE 7.5 MG/ML
INJECTION, SOLUTION INTRASPINAL AS NEEDED
Status: DISCONTINUED | OUTPATIENT
Start: 2024-12-26 | End: 2024-12-26

## 2024-12-26 RX ORDER — PROMETHAZINE HYDROCHLORIDE 25 MG/ML
12.5 INJECTION, SOLUTION INTRAMUSCULAR; INTRAVENOUS ONCE AS NEEDED
Status: DISCONTINUED | OUTPATIENT
Start: 2024-12-26 | End: 2024-12-26

## 2024-12-26 RX ORDER — ONDANSETRON 2 MG/ML
4 INJECTION INTRAMUSCULAR; INTRAVENOUS EVERY 8 HOURS PRN
Status: DISCONTINUED | OUTPATIENT
Start: 2024-12-26 | End: 2024-12-29 | Stop reason: HOSPADM

## 2024-12-26 RX ORDER — OXYCODONE HYDROCHLORIDE 10 MG/1
10 TABLET ORAL EVERY 4 HOURS PRN
Refills: 0 | Status: CANCELLED | OUTPATIENT
Start: 2024-12-26

## 2024-12-26 RX ADMIN — SODIUM CHLORIDE, SODIUM LACTATE, POTASSIUM CHLORIDE, AND CALCIUM CHLORIDE 999 ML/HR: .6; .31; .03; .02 INJECTION, SOLUTION INTRAVENOUS at 06:40

## 2024-12-26 RX ADMIN — KETOROLAC TROMETHAMINE 30 MG: 30 INJECTION, SOLUTION INTRAMUSCULAR; INTRAVENOUS at 09:21

## 2024-12-26 RX ADMIN — CEFAZOLIN SODIUM 2000 MG: 2 SOLUTION INTRAVENOUS at 08:19

## 2024-12-26 RX ADMIN — SODIUM CHLORIDE, SODIUM LACTATE, POTASSIUM CHLORIDE, AND CALCIUM CHLORIDE: .6; .31; .03; .02 INJECTION, SOLUTION INTRAVENOUS at 09:10

## 2024-12-26 RX ADMIN — FENTANYL CITRATE 15 MCG: 50 INJECTION INTRAMUSCULAR; INTRAVENOUS at 08:18

## 2024-12-26 RX ADMIN — Medication 999 MILLI-UNITS/MIN: at 08:51

## 2024-12-26 RX ADMIN — MORPHINE SULFATE 0.15 MG: 0.5 INJECTION, SOLUTION EPIDURAL; INTRATHECAL; INTRAVENOUS at 08:18

## 2024-12-26 RX ADMIN — GLYCOPYRROLATE 0.2 MG: 0.2 INJECTION INTRAMUSCULAR; INTRAVENOUS at 08:15

## 2024-12-26 RX ADMIN — OXYTOCIN 62.5 MILLI-UNITS/MIN: 10 INJECTION INTRAVENOUS at 10:12

## 2024-12-26 RX ADMIN — ONDANSETRON 4 MG: 2 INJECTION INTRAMUSCULAR; INTRAVENOUS at 12:19

## 2024-12-26 RX ADMIN — PHENYLEPHRINE HYDROCHLORIDE 40 MCG/MIN: 50 INJECTION INTRAVENOUS at 08:19

## 2024-12-26 RX ADMIN — KETOROLAC TROMETHAMINE 15 MG: 30 INJECTION, SOLUTION INTRAMUSCULAR; INTRAVENOUS at 21:38

## 2024-12-26 RX ADMIN — SODIUM CHLORIDE, SODIUM LACTATE, POTASSIUM CHLORIDE, AND CALCIUM CHLORIDE: .6; .31; .03; .02 INJECTION, SOLUTION INTRAVENOUS at 08:19

## 2024-12-26 RX ADMIN — DEXAMETHASONE SODIUM PHOSPHATE 10 MG: 10 INJECTION INTRAMUSCULAR; INTRAVENOUS at 09:26

## 2024-12-26 RX ADMIN — ONDANSETRON 8 MG: 2 INJECTION, SOLUTION INTRAMUSCULAR; INTRAVENOUS at 08:15

## 2024-12-26 RX ADMIN — ACETAMINOPHEN 650 MG: 325 TABLET, FILM COATED ORAL at 18:18

## 2024-12-26 RX ADMIN — BUPIVACAINE HYDROCHLORIDE IN DEXTROSE 1.5 ML: 7.5 INJECTION, SOLUTION SUBARACHNOID at 08:18

## 2024-12-26 RX ADMIN — DOCUSATE SODIUM 100 MG: 100 CAPSULE, LIQUID FILLED ORAL at 18:18

## 2024-12-26 RX ADMIN — KETOROLAC TROMETHAMINE 15 MG: 30 INJECTION, SOLUTION INTRAMUSCULAR; INTRAVENOUS at 15:02

## 2024-12-26 NOTE — ANESTHESIA PROCEDURE NOTES
Spinal Block    Patient location during procedure: OB/L&D  Start time: 12/26/2024 8:18 AM  Reason for block: procedure for pain, at surgeon's request, post-op pain management and primary anesthetic  Staffing  Performed by: Jj Osuna CRNA  Authorized by: Genaro Mccabe MD    Preanesthetic Checklist  Completed: patient identified, IV checked, site marked, risks and benefits discussed, surgical consent, monitors and equipment checked, pre-op evaluation and timeout performed  Spinal Block  Patient position: sitting  Prep: ChloraPrep  Patient monitoring: heart rate, cardiac monitor, continuous pulse ox and frequent blood pressure checks  Approach: midline  Location: L3-4  Needle  Needle type: Pencan   Needle gauge: 24 G  Needle length: 3.5 in  Assessment  Injection Assessment:  negative aspiration for heme, no paresthesia on injection and positive aspiration for clear CSF.  Post-procedure:  site cleaned  Additional Notes  Unremarkable spinal

## 2024-12-26 NOTE — PLAN OF CARE

## 2024-12-26 NOTE — ASSESSMENT & PLAN NOTE
QBL: 401cc; admission Hb 12.1g/dL -> 9.7, Venofer ordered. Patient progressing toward postoperative milestones.  - Continue routine postoperative care  - Pain management with oral analgesics  - DVT Ppx: Lovenox 40mg qd; encourage ambulation  - F/u void trial  - Encourage breastfeeding, familial bonding

## 2024-12-26 NOTE — LACTATION NOTE
This note was copied from a baby's chart.  CONSULT - LACTATION  Baby Boy (Butch Morrison 0 days male MRN: [id number]    [facility] AN NURSERY Room / Bed: (N)/(N) Encounter: [id number]    Maternal Information     MOTHER:  Savannah Morrison  Maternal Age: 34 y.o.  OB History: # 1 - Date: 24, Sex: Male, Weight: 3415 g (7 lb 8.5 oz), GA: 39w2d, Type: , Low Transverse, Apgar1: 4, Apgar5: 9, Living: Living, Birth Comments: None   Previouse breast reduction surgery? No    Lactation history:   Has patient previously breast fed: No   How long had patient previously breast fed:     Previous breast feeding complications:       Past Surgical History:   Procedure Laterality Date    TONSILECTOMY AND ADNOIDECTOMY      age 9       Birth information:  YOB: 2022   Time of birth: 8:48 AM   Sex: male   Delivery type: , Low Transverse   Birth Weight: 3415 g (7 lb 8.5 oz)   Percent of Weight Change: 0%     Gestational Age: 39w2d      24 1500   Lactation Consultation   Reason for Consult 20;20 min   Maternal Information   Has mother  before? No   LATCH Documentation   Latch 2   Audible Swallowing 2   Type of Nipple 2   Comfort (Breast/Nipple) 2   Hold (Positioning) 1   LATCH Score 9   Having latch problems? No   Position(s) Used Cross Cradle   Breasts/Nipples   Left Breast Soft   Right Breast Soft   Left Nipple Everted   Right Nipple Everted   Intervention Hand expression   Breastfeeding Progress Breastfeeding well   Breast Pump   Pump 3  (Has Mom Renetta)   Patient Follow-Up   Lactation Consult Status 2   Follow-Up Type Inpatient;Call as needed   Other OB Lactation Documentation    Additional Problem Noted Initial: Family usure of anything about breastfeeding except that she has a desire to try. Discussed AAP recommendations. HE highly effective. Infant body contracted from breech presentation. Latched well with little guidance. Enc. frequent feedings.      Feeding recommendations:  breast feed on demand    Information on hand expression given. Discussed benefits of knowing how to manually express breast including stimulating milk supply, softening nipple for latch and evacuating breast in the event of engorgement.    Reviewed how to bring baby to the breast so that his lower lip and chin touch the breast with his nose just above the nipple to encourage a wider, more asymmetric latch.    Discussed AAP recommendation of breastfeeding for 2 years. First six months being exclusively breast milk.    Encouraged parents to call for assistance, questions, and concerns about breastfeeding.      Minerva Cuevas RN 12/26/2024 3:05 PM

## 2024-12-26 NOTE — PLAN OF CARE
Problem: BIRTH - VAGINAL/ SECTION  Goal: Fetal and maternal status remain reassuring during the birth process  Description: INTERVENTIONS:  - Monitor vital signs  - Monitor fetal heart rate  - Monitor uterine activity  - Monitor labor progression (vaginal delivery)  - DVT prophylaxis  - Antibiotic prophylaxis  Outcome: Progressing  Goal: Emotionally satisfying birthing experience for mother/fetus  Description: Interventions:  - Assess, plan, implement and evaluate the nursing care given to the patient in labor  - Advocate the philosophy that each childbirth experience is a unique experience and support the family's chosen level of involvement and control during the labor process   - Actively participate in both the patient's and family's teaching of the birth process  - Consider cultural, Jewish and age-specific factors and plan care for the patient in labor  Outcome: Progressing     Problem: PAIN - ADULT  Goal: Verbalizes/displays adequate comfort level or baseline comfort level  Description: Interventions:  - Encourage patient to monitor pain and request assistance  - Assess pain using appropriate pain scale  - Administer analgesics based on type and severity of pain and evaluate response  - Implement non-pharmacological measures as appropriate and evaluate response  - Consider cultural and social influences on pain and pain management  - Notify physician/advanced practitioner if interventions unsuccessful or patient reports new pain  Outcome: Progressing     Problem: INFECTION - ADULT  Goal: Absence or prevention of progression during hospitalization  Description: INTERVENTIONS:  - Assess and monitor for signs and symptoms of infection  - Monitor lab/diagnostic results  - Monitor all insertion sites, i.e. indwelling lines, tubes, and drains  - Monitor endotracheal if appropriate and nasal secretions for changes in amount and color  - [place] appropriate cooling/warming therapies per order  -  Administer medications as ordered  - Instruct and encourage patient and family to use good hand hygiene technique  - Identify and instruct in appropriate isolation precautions for identified infection/condition  Outcome: Progressing  Goal: Absence of fever/infection during neutropenic period  Description: INTERVENTIONS:  - Monitor WBC    Outcome: Progressing     Problem: SAFETY ADULT  Goal: Patient will remain free of falls  Description: INTERVENTIONS:  - Educate patient/family on patient safety including physical limitations  - Instruct patient to call for assistance with activity   - Consult OT/PT to assist with strengthening/mobility   - Keep Call bell within reach  - Keep bed low and locked with side rails adjusted as appropriate  - Keep care items and personal belongings within reach  - Initiate and maintain comfort rounds  Outcome: Progressing  Goal: Maintain or return to baseline ADL function  Description: INTERVENTIONS:  -  Assess patient's ability to carry out ADLs; assess patient's baseline for ADL function and identify physical deficits which impact ability to perform ADLs (bathing, care of mouth/teeth, toileting, grooming, dressing, etc.)  - Assess/evaluate cause of self-care deficits   - Assess range of motion  - Assess patient's mobility; develop plan if impaired  - Assess patient's need for assistive devices and provide as appropriate  - Encourage maximum independence but intervene and supervise when necessary  - Involve family in performance of ADLs  - Assess for home care needs following discharge   - Consider OT consult to assist with ADL evaluation and planning for discharge  - Provide patient education as appropriate  Outcome: Progressing  Goal: Maintains/Returns to pre admission functional level  Description: INTERVENTIONS:  - Perform AM-PAC 6 Click Basic Mobility/ Daily Activity assessment daily.  - Set and communicate daily mobility goal to care team and patient/family/caregiver.   -  Collaborate with rehabilitation services on mobility goals if consulted  - Out of bed for toileting  - Record patient progress and toleration of activity level   Outcome: Progressing     Problem: Knowledge Deficit  Goal: Patient/family/caregiver demonstrates understanding of disease process, treatment plan, medications, and discharge instructions  Description: Complete learning assessment and assess knowledge base.  Interventions:  - Provide teaching at level of understanding  - Provide teaching via preferred learning methods  Outcome: Progressing     Problem: DISCHARGE PLANNING  Goal: Discharge to home or other facility with appropriate resources  Description: INTERVENTIONS:  - Identify barriers to discharge w/patient and caregiver  - Arrange for needed discharge resources and transportation as appropriate  - Identify discharge learning needs (meds, wound care, etc.)  - Arrange for interpretive services to assist at discharge as needed  - Refer to Case Management Department for coordinating discharge planning if the patient needs post-hospital services based on physician/advanced practitioner order or complex needs related to functional status, cognitive ability, or social support system  Outcome: Progressing

## 2024-12-26 NOTE — DISCHARGE SUMMARY
Obstetrics Discharge Summary  Savannah Morrison 34 y.o. female MRN: [id number]  Unit/Bed#: LD PACU- Encounter: [id number]    Admission Date: 2024   Admitting Attending: Lamar Urrutia MD    Delivery Method: , Low Transverse   Delivery Date and Time: 2024 8:48 AM  Delivery Attending: Lamar Urrutia MD    Anesthesia: spinal    Discharge Date: 24   Discharge Attending: Shy Jones MD    Principal Diagnosis: Pregnancy at 39w2d    Secondary Diagnosis, Admission:  Psoriasis  Breech presentation of fetus    Discharge Diagnosis:  Same, delivered    Procedures: primary  section, low transverse incision    Hospital course:   Savannah Morrison presented as a 34 y.o.  at 39w2d with history significant for migraine, psoriasis, psoriatic arthritis. Her pregnancy was complicated by breech presentation. She presented to labor and delivery for scheduled primary low transverse  section.     On 24 @0848 she delivered a viable male  @39w2d via primary  section, low transverse incision. 's birth weight was 7lbs 8.5oz; Apgars were 4 (1 min) and 9 (5 min).    Her post-delivery course was uncomplicated. Her preoperative hemoglobin was 12.1g/dL, postoperative was 9.7. Mom's blood type is A positive. RhoGAM was not indicated.    Her postpartum pain was well controlled with oral analgesics. On day of discharge, she was ambulating and able to reasonably perform all ADLs. She was voiding and had appropriate bowel function. She was discharged home on postpartum day #3 without complications. Patient was instructed to follow up with her OBGYN as an outpatient and was given appropriate warnings to call provider if she develops signs of infection or uncontrolled pain.    Complications: none apparent    Condition at discharge: good     Provisions for Follow-Up Care:  Discharge Medications:   Discharge Instructions:   Please see after visit summary for information related  to follow-up care, medications, and any other discharge instructions.    Disposition: Home    Planned Readmission: Aubrie Villa  PGY-1 OB/GYN  12/29/24  7:16 AM

## 2024-12-26 NOTE — DISCHARGE INSTR - AVS FIRST PAGE
Congratulations Savannah!    For pain:   Tylenol: Up to 1000mg (975mg = 3 tablets) every 6 hours  Motrin: Up to 600mg (1 tablet prescription or 3 tablets of the regular strength 200mg ibuprofen) every 6 hours  Roxicodone: Up to 5mg every 4 hours as needed for severe pain    I recommend alternating Tylenol and Motrin every 3 hours (Tylenol dosing then 3 hours later Motrin dosing then 3 hours later Tylenol dosing then 3 hours later Motrin dosing, etc.)    Please call the office with any concerns especially if you have any chest pain, shortness of breath, fevers, chills, changes in your vision, persistent headache, elevated blood pressures (>140/>90) or pain in your upper belly on the right side, or concerns about your incision or bleeding. We will see you in the office in 1 week.    Take care,   - Dr. Vera          Discharge instructions:   -Do not place anything (no partner, sex toys, tampons, douche, etc.) in your vagina for 6 weeks  -You may walk for exercise for the first 6 weeks then gradually return to your usual activities   -Please do not drive for 1 week if you have no stitches and for 2 weeks if you have stitches    -Please do not drive if you are taking any narcotic medications  -Please examine your breasts in the mirror daily and call your doctor for redness, tenderness, increased warmth, fevers, or chills  -Please call your doctor's office for temperature > 100.4*F or 38*C, worsening pain, increased bleeding (filling 2 or more maxi pads within 1 hr or less for at least 2 hrs), foul-smelling discharge/drainage, burning with urination, or symptoms of depression

## 2024-12-26 NOTE — ANESTHESIA POSTPROCEDURE EVALUATION
Post-Op Assessment Note    CV Status:  Stable    Pain management: adequate       Mental Status:  Alert and awake   Hydration Status:  Euvolemic   PONV Controlled:  Controlled   Airway Patency:  Patent     Post Op Vitals Reviewed: Yes    No anethesia notable event occurred.    Staff: CRNA           Last Filed PACU Vitals:  Vitals Value Taken Time   Temp 98    Pulse 66    /77    Resp 12    SpO2 99        Modified Ted:  No data recorded

## 2024-12-26 NOTE — PLAN OF CARE
Problem: BIRTH - VAGINAL/ SECTION  Goal: Fetal and maternal status remain reassuring during the birth process  Description: INTERVENTIONS:  - Monitor vital signs  - Monitor fetal heart rate  - Monitor uterine activity  - Monitor labor progression (vaginal delivery)  - DVT prophylaxis  - Antibiotic prophylaxis  2024 by Lamar Douglas RN  Outcome: Completed  2024 by Lamar Douglas RN  Outcome: Progressing  Goal: Emotionally satisfying birthing experience for mother/fetus  Description: Interventions:  - Assess, plan, implement and evaluate the nursing care given to the patient in labor  - Advocate the philosophy that each childbirth experience is a unique experience and support the family's chosen level of involvement and control during the labor process   - Actively participate in both the patient's and family's teaching of the birth process  - Consider cultural, Spiritism and age-specific factors and plan care for the patient in labor  2024 by Lamar Douglas RN  Outcome: Completed  2024 by Lamar Douglas RN  Outcome: Progressing

## 2024-12-26 NOTE — H&P
H & P- Obstetrics   Savannah Morrison 34 y.o. female MRN: [id number]  Unit/Bed#: LD TRIAGE 3 Encounter: [id number]    Assessment: 34 y.o.  at 39w2d admitted for primary  section in the setting of breech presentation.  FHT: reactive  [place]: intermittent ctx    EFW: 62%ile @33w2d  Presentation: breech, confirmed by US on 24  GBS status: neg    Plan:   * 39 weeks gestation of pregnancy  Assessment & Plan  Patient presenting for primary  section in the setting of breech presentation. [place] with contractions q**min, FHT **  - admit to labor & delivery floor  - IV fluids, NPO  - labs: CBC, T/S, RPR  - Ancef 2g  - analgesia per anesthesiology    Psoriatic arthritis (HCC)  Assessment & Plan  Uses clobetasol and hydrocortisone at home      Discussed case and plan w/ Dr. Urrutia    Chief Concern: 1LTCS for breech presentation    HPI: Savannah Morrison is a 34 y.o.  with an JEAN of 2024, by Last Menstrual Period at 39w2d who is being admitted for primary  section for breech presentation. History is significant for migraine, psoriasis. Pregnancy is complicated by breech presentation. She denies having uterine contractions, has no LOF, and reports no VB. She states she has felt good FM.    Patient Active Problem List   Diagnosis    Psoriatic arthritis ([facility])    Family history of thyroid problem    Major depressive disorder in remission ([facility])    39 weeks gestation of pregnancy    Pregnancy related hip pain in third trimester, antepartum    Breech presentation, no version     OB Hx:  OB History    Para Term  AB Living   1 0 0 0 0 0   SAB IAB Ectopic Multiple Live Births   0 0 0 0 0      # Outcome Date GA Lbr Ismael/2nd Weight Sex Type Anes PTL Lv   1 Current              Past Medical Hx:  Past Medical History:   Diagnosis Date    Anemia     Depression     Migraine     Psoriasis     Psoriatic arthritis (HCC)     Varicella      Past Surgical hx:  Past Surgical History:    Procedure Laterality Date    TONSILECTOMY AND ADNOIDECTOMY      age 9     Social History     Socioeconomic History    Marital status: /Civil Union     Spouse name: Not on file    Number of children: Not on file    Years of education: Not on file    Highest education level: Not on file   Occupational History    Not on file   Tobacco Use    Smoking status: Never    Smokeless tobacco: Never   Vaping Use    Vaping status: Never Used   Substance and Sexual Activity    Alcohol use: Not Currently     Comment: Not weekly. Occasional. 2 times a month    Drug use: Never    Sexual activity: Yes     Partners: Male   Other Topics Concern    Not on file   Social History Narrative    Not on file     Social Drivers of Health     Financial Resource Strain: Not on file   Food Insecurity: No Food Insecurity (2024)    Nursing - Inadequate Food Risk Classification     Worried About Running Out of Food in the Last Year: Never true     Ran Out of Food in the Last Year: Never true     Ran Out of Food in the Last Year: 1   Transportation Needs: No Transportation Needs (2024)    Nursing - Transportation Risk Classification     Lack of Transportation: Not on file     Lack of Transportation: 2   Physical Activity: Not on file   Stress: Not on file   Social Connections: Not on file   Intimate Partner Violence: Patient Unable To Answer (2024)    Nursing IPS     Feels Physically and Emotionally Safe: Not on file     Physically Hurt by Someone: Not on file     Humiliated or Emotionally Abused by Someone: Not on file     Physically Hurt by Someone: 97     Hurt or Threatened by Someone: 97   Housing Stability: Unknown (2024)    Nursing: Inadequate Housing Risk Classification     Has Housing: Not on file     Worried About Losing Housing: Not on file     Unable to Get Utilities: Not on file     Unable to Pay for Housing in the Last Year: 2     Has Housin     Allergies   Allergen Reactions    Sulfa Antibiotics Hives        Medications Prior to Admission:     clobetasol (TEMOVATE) 0.05 % external solution    clobetasol (TEMOVATE) 0.05 % ointment    hydrocortisone 2.5 % ointment    Omega-3 Fatty Acids (OMEGA 3 PO)    Prenatal Vit-Fe Fumarate-FA (Prenatal/Folic Acid) TABS    Pyridoxine HCl (vitamin B-6) 25 MG tablet    ondansetron (ZOFRAN) 4 mg tablet    Objective:  Temp:  [97.9 °F (36.6 °C)] 97.9 °F (36.6 °C)  HR:  [85] 85  BP: (117)/(68) 117/68  Resp:  [16] 16  SpO2:  [99 %] 99 %  Body mass index is 32.55 kg/m².     Physical Exam:  Physical Exam  Vitals reviewed.   Constitutional:       General: She is not in acute distress.     Appearance: She is well-developed.   HENT:      Head: Normocephalic and atraumatic.   Cardiovascular:      Rate and Rhythm: Normal rate.   Pulmonary:      Effort: Pulmonary effort is normal. No respiratory distress.   Skin:     Findings: No rash (on exposed skin).   Neurological:      Mental Status: She is alert and oriented to person, place, and time.   Psychiatric:         Mood and Affect: Affect normal.         Speech: Speech normal.         Behavior: Behavior normal.        FHT:  Baseline: 140 bpm  Variability: moderate  Accels: >2, 15x15  Decels: absent    TOCO:   Intermittent ctx    Lab Results   Component Value Date    WBC 10.37 (H) 12/26/2024    HGB 12.1 12/26/2024    HCT 36.3 12/26/2024     12/26/2024     Lab Results   Component Value Date    K 3.9 12/11/2024     12/11/2024    CO2 26 12/11/2024    BUN 6 12/11/2024    CREATININE 0.51 (L) 12/11/2024    AST 22 12/11/2024    ALT 21 12/11/2024     Prenatal Labs:   Blood type: A pos  Antibody: neg  GBS: neg  HIV: nr  Rubella: immune  Syphilis IgM/IgG: nr  HBsAg: nr  HCAb: nr  Chlamydia: neg  Gonorrhea: neg  Diabetes 1 hour screen: 78  3 hour glucose: n/a  Platelets: pending  Hgb: pending  Expected length of stay: >2 Midnights  Admission status: INPATIENT     Samia Galeana MD  OBGYN PGY-1  12/26/24  8:03 AM

## 2024-12-26 NOTE — OP NOTE
OPERATIVE REPORT  PATIENT NAME: Savannah Morrison    :  1990  MRN: [id number]  Pt Location: AN L&D OR ROOM 02    SURGERY DATE: 2024    Surgeons and Role:     * Lamar Urrutia MD - Primary     * Samia Galeana MD - Assisting    Preop Diagnosis:  Patient-requested procedure [Z41.9]  Breech presentation of fetus    Post-Op Diagnosis Codes:     * Patient-requested procedure [Z41.9]  Breech presentation of fetus    Procedure(s) (LRB):   SECTION () (N/A)    Specimen(s):  ID Type Source Tests Collected by Time Destination   A :  Tissue (Placenta on Hold) OB Only Placenta PLACENTA IN STORAGE Lamar Urrutia MD 2024 0849    B :  Cord Blood Cord BLOOD GAS, VENOUS, CORD, BLOOD GAS, ARTERIAL, CORD Lamar Urrutia MD 2024 0849      Surgical QBL:  Surgical QBL (mL): 401 mL    Drains:  Urethral Catheter Double-lumen;Non-latex 16 Fr. (Active)   Reasons to continue Urinary Catheter  Post-operative urological requirements 24 0945   Goal for Removal Remove POD#1 24 0945   Site Assessment Clean;Skin intact 24 0945   Collection Container Standard drainage bag 24 0945   Number of days: 0     Anesthesia Type:   Spinal    Operative Indications:  Patient-requested procedure [Z41.9]  Breech presentation of fetus     Valentin Group Classification System:  No Multiple pregnancy, No Transverse or oblique lie, Breech lie, Nulliparous +  is VALENTIN GROUP 6    Operative Findings:  Viable male  at 0848 with APGARs of 4 and 9 at 1 and 5 minutes, respectively. Fetus weight: 7lb 8.5oz.  Normal intact placenta with centrally inserted 3 vessel cord expressed at 0852.  Normal uterus, bilateral tubes and ovaries.  Fetal cord gases:  Recent Results (from the past 3 hours)   CORD, Blood gas, venous    Collection Time: 24  8:49 AM   Result Value Ref Range    pH, Cord Wojciech 7.217 7.190 - 7.490    pCO2, Cord Wojciech 65.3 (H) 27.0 - 43.0 mm HG    pO2, Cord Wojciech 17.1 15.0 -  45.0 mm HG    HCO3, Cord Wojciech 25.9 12.2 - 28.6 mmol/L    Base Exc, Cord Wojciech -3.3 (L) 1.0 - 9.0 mmol/L    O2 Cont, Cord Wojciech 6.6 mL/dL    O2 HGB,VENOUS CORD 31.9 %   CORD, Blood gas, arterial    Collection Time: 12/26/24  8:49 AM   Result Value Ref Range    pH, Cord Art 7.198 (L) 7.230 - 7.430    pCO2, Cord Art 65.6 (H) 30.0 - 60.0    pO2, Cord Art 14.8 5.0 - 25.0 mm HG    HCO3, Cord Art 24.9 17.3 - 27.3 mmol/L    Base Exc, Cord Art -4.5 (L) 3.0 - 11.0 mmol/L    O2 Content, Cord Art 5.5 ml/dl    O2 Hgb, Arterial Cord 25.9 %     Procedure and technique:  The patient was taken to the operating room. Spinal anesthesia was adequately established and Ancef was given for preoperative prophylaxis. The patient was then placed in the dorsal supine position with a left tilt of the hips. The patient was prepped with chlorhexidine for vaginal prep and chloraprep for abdominal prep and draped in the usual sterile fashion.    A time out was performed to confirm correct patient and correct procedure. An incision was made in the skin with a surgical scalpel, and sharp dissection was carried out over subsequent layers of tissue including the fascia using the Bovie electrocautery. The fascia was incised at the midline, and the fascial incision was extended bilaterally using the curved Cantu scissors.    The superior edge of the fascial incision was grasped with Kocher clamps, tented up, and the underlying rectus muscles were dissected off bluntly and sharply using the curved Cantu scissors. The rectus muscles were then divided at midline, and the peritoneum was identified and entered and extended superiorly and inferiorly in blunt fashion. The bladder blade was inserted. A transverse incision was made in the lower uterine segment using a new surgical blade. The uterine incision was extended cephalad and caudal using blunt dissection. The amniotic sac was entered and the amniotic fluid was noted to be clear.    The surgeon's hand was  placed into the uterine cavity. The fetal pelvis was palpated with the fetus in leo breech position. The fetal pelvis was elevated to the level of the hysterotomy, at which time the surgeon's hands were placed with thumbs on the fetal sacrum and fingers on the fetal anterior superior iliac spines. The fetal pelvis was delivered, and the Pinard maneuver was performed to deliver the fetal legs. The fetus was then delivered to the level of the scapulae. The fetus was rotated to have the left arm anterior, and the Loveset maneuver was performed to deliver the left fetal arm. The fetus was rotated to have the right arm anterior, and the right fetal arm was delivered in the same fashion. The Mauriceau Smellie Veit maneuver was performed to flex the fetal head, after which the fetal head spontaneously delivered. A nuchal cord was noted; it was reduced without difficultly.     On delivery, the cord was doubly clamped and cut after delayed cord clamping. The infant was then passed off the table to awaiting  staff. The  was noted to cry spontaneously and moved all extremities. Venous and arterial blood gas, cord blood, and portion of cord were obtained for analysis and routine blood testing. The placenta was delivered and sent to storage. The placenta was noted to be intact with a centrally inserted three-vessel cord. Oxytocin was administered by IV infusion to enhance uterine contraction. The uterus was exteriorized and cleared of all clots and remaining products of conception.    The uterine incision was re-approximated using 0 Vicryl in a running locked fashion. A second vertical imbricating stitch with 0 Monocryl was applied. The uterine incision was examined and noted to be hemostatic. The posterior cul-de-sac was cleared of all clots and products of conception. The uterus was replaced into the abdomen, and the paracolic gutters were cleared of all clots. The uterine incision was once again re-examined  and noted to be hemostatic. The rectus muscles were examined and noted to be hemostatic. The fascia was re-approximated using 0 Vicryl in a running nonlocked fashion. The subcutaneous tissue was irrigated and cleared of all clots and debris. The subcutaneous tissue was re-approximated with 2-0 Monocryl in a running fashion. The skin incision was closed with 4-0 Monocryl in a running subcuticular fashion. Good hemostasis was noted.     Patient tolerated the procedure well. All needle, sponge, and instrument counts were noted to be correct x2 at the end of the procedure. Patient was transferred to the recovery room in stable condition. Attending physician Dr. Urrutia was present for the duration of the procedure.    Complications:   None apparent    Patient Disposition:  PACU     SIGNATURE: Samia Galeana MD  DATE: December 26, 2024  TIME: 10:35 AM

## 2024-12-26 NOTE — ANESTHESIA PREPROCEDURE EVALUATION
Procedure:   SECTION () (Uterus)     @ 39.2 wks in breech presentation for primary     Relevant Problems   GYN   (+) 39 weeks gestation of pregnancy      MUSCULOSKELETAL   (+) Psoriatic arthritis (HCC)      NEURO/PSYCH   (+) Major depressive disorder in remission (HCC)        Physical Exam    Airway    Mallampati score: II  TM Distance: >3 FB  Neck ROM: full     Dental   No notable dental hx     Cardiovascular  Rhythm: regular, Rate: normal, Cardiovascular exam normal    Pulmonary  Pulmonary exam normal Breath sounds clear to auscultation    Other Findings  post-pubertal.      Anesthesia Plan  ASA Score- 2     Anesthesia Type- spinal with ASA Monitors.         Additional Monitors:     Airway Plan: ETT.    Comment: Discussed the risks/benefits of neuraxial anesthesia, including risk of bleeding/infection/damage to underlying structures, the likely side effect of nausea, and unlikely complication of spinal headache.  Plan to maintain native airway with EtCO2 monitoring under spinal anesthesia, general anesthesia with OETT as backup..       Plan Factors-Exercise tolerance (METS): >4 METS.    Chart reviewed.   Existing labs reviewed. Patient summary reviewed.      Patient instructed to abstain from smoking on day of procedure. Patient did not smoke on day of surgery.            Induction-     Postoperative Plan- Plan for postoperative opioid use.     Perioperative Resuscitation Plan - Level 1 - Full Code.       Informed Consent- Anesthetic plan and risks discussed with patient.  I personally reviewed this patient with the CRNA. Discussed and agreed on the Anesthesia Plan with the CRNA..

## 2024-12-27 PROBLEM — Z98.891 STATUS POST PRIMARY LOW TRANSVERSE CESAREAN SECTION: Chronic | Status: ACTIVE | Noted: 2024-06-20

## 2024-12-27 LAB
ERYTHROCYTE [DISTWIDTH] IN BLOOD BY AUTOMATED COUNT: 13.7 % (ref 11.6–15.1)
ERYTHROCYTE [DISTWIDTH] IN BLOOD BY AUTOMATED COUNT: 13.7 % (ref 11.6–15.1)
HCT VFR BLD AUTO: 29.5 % (ref 34.8–46.1)
HCT VFR BLD AUTO: 29.5 % (ref 34.8–46.1)
HGB BLD-MCNC: 9.7 G/DL (ref 11.5–15.4)
HGB BLD-MCNC: 9.7 G/DL (ref 11.5–15.4)
MCH RBC QN AUTO: 31.2 PG (ref 26.8–34.3)
MCH RBC QN AUTO: 31.2 PG (ref 26.8–34.3)
MCHC RBC AUTO-ENTMCNC: 32.9 G/DL (ref 31.4–37.4)
MCHC RBC AUTO-ENTMCNC: 32.9 G/DL (ref 31.4–37.4)
MCV RBC AUTO: 95 FL (ref 82–98)
MCV RBC AUTO: 95 FL (ref 82–98)
PLATELET # BLD AUTO: 195 THOUSANDS/UL (ref 149–390)
PLATELET # BLD AUTO: 195 THOUSANDS/UL (ref 149–390)
PMV BLD AUTO: 9.8 FL (ref 8.9–12.7)
PMV BLD AUTO: 9.8 FL (ref 8.9–12.7)
RBC # BLD AUTO: 3.11 MILLION/UL (ref 3.81–5.12)
RBC # BLD AUTO: 3.11 MILLION/UL (ref 3.81–5.12)
WBC # BLD AUTO: 13.22 THOUSAND/UL (ref 4.31–10.16)
WBC # BLD AUTO: 13.22 THOUSAND/UL (ref 4.31–10.16)

## 2024-12-27 PROCEDURE — 85027 COMPLETE CBC AUTOMATED: CPT

## 2024-12-27 PROCEDURE — 99024 POSTOP FOLLOW-UP VISIT: CPT | Performed by: OBSTETRICS & GYNECOLOGY

## 2024-12-27 RX ORDER — ENOXAPARIN SODIUM 100 MG/ML
40 INJECTION SUBCUTANEOUS
Status: DISCONTINUED | OUTPATIENT
Start: 2024-12-27 | End: 2024-12-29 | Stop reason: HOSPADM

## 2024-12-27 RX ADMIN — DOCUSATE SODIUM 100 MG: 100 CAPSULE, LIQUID FILLED ORAL at 08:27

## 2024-12-27 RX ADMIN — ACETAMINOPHEN 650 MG: 325 TABLET, FILM COATED ORAL at 00:06

## 2024-12-27 RX ADMIN — DOCUSATE SODIUM 100 MG: 100 CAPSULE, LIQUID FILLED ORAL at 18:01

## 2024-12-27 RX ADMIN — ACETAMINOPHEN 650 MG: 325 TABLET, FILM COATED ORAL at 12:39

## 2024-12-27 RX ADMIN — KETOROLAC TROMETHAMINE 15 MG: 30 INJECTION, SOLUTION INTRAMUSCULAR; INTRAVENOUS at 03:16

## 2024-12-27 RX ADMIN — KETOROLAC TROMETHAMINE 15 MG: 30 INJECTION, SOLUTION INTRAMUSCULAR; INTRAVENOUS at 09:34

## 2024-12-27 RX ADMIN — IRON SUCROSE 200 MG: 20 INJECTION, SOLUTION INTRAVENOUS at 08:09

## 2024-12-27 RX ADMIN — ACETAMINOPHEN 650 MG: 325 TABLET, FILM COATED ORAL at 18:02

## 2024-12-27 RX ADMIN — ACETAMINOPHEN 650 MG: 325 TABLET, FILM COATED ORAL at 05:42

## 2024-12-27 RX ADMIN — ENOXAPARIN SODIUM 40 MG: 40 INJECTION SUBCUTANEOUS at 08:27

## 2024-12-27 RX ADMIN — IBUPROFEN 600 MG: 600 TABLET, FILM COATED ORAL at 18:02

## 2024-12-27 NOTE — PLAN OF CARE

## 2024-12-27 NOTE — PLAN OF CARE
Problem: PAIN - ADULT  Goal: Verbalizes/displays adequate comfort level or baseline comfort level  Description: Interventions:  - Encourage patient to monitor pain and request assistance  - Assess pain using appropriate pain scale  - Administer analgesics based on type and severity of pain and evaluate response  - Implement non-pharmacological measures as appropriate and evaluate response  - Consider cultural and social influences on pain and pain management  - Notify physician/advanced practitioner if interventions unsuccessful or patient reports new pain  Outcome: Progressing     Problem: INFECTION - ADULT  Goal: Absence or prevention of progression during hospitalization  Description: INTERVENTIONS:  - Assess and monitor for signs and symptoms of infection  - Monitor lab/diagnostic results  - Monitor all insertion sites, i.e. indwelling lines, tubes, and drains  - Monitor endotracheal if appropriate and nasal secretions for changes in amount and color  - [place] appropriate cooling/warming therapies per order  - Administer medications as ordered  - Instruct and encourage patient and family to use good hand hygiene technique  - Identify and instruct in appropriate isolation precautions for identified infection/condition  Outcome: Progressing  Goal: Absence of fever/infection during neutropenic period  Description: INTERVENTIONS:  - Monitor WBC    Outcome: Progressing     Problem: SAFETY ADULT  Goal: Patient will remain free of falls  Description: INTERVENTIONS:  - Educate patient/family on patient safety including physical limitations  - Instruct patient to call for assistance with activity   - Consult OT/PT to assist with strengthening/mobility   - Keep Call bell within reach  - Keep bed low and locked with side rails adjusted as appropriate  - Keep care items and personal belongings within reach  - Initiate and maintain comfort rounds  - Make Fall Risk Sign visible to staff  - Offer Toileting  -  Initiate/Maintain alarm  - Obtain necessary fall risk management equipment:   - Apply yellow socks and bracelet for high fall risk patients  - Consider moving patient to room near nurses station  Outcome: Progressing  Goal: Maintain or return to baseline ADL function  Description: INTERVENTIONS:  -  Assess patient's ability to carry out ADLs; assess patient's baseline for ADL function and identify physical deficits which impact ability to perform ADLs (bathing, care of mouth/teeth, toileting, grooming, dressing, etc.)  - Assess/evaluate cause of self-care deficits   - Assess range of motion  - Assess patient's mobility; develop plan if impaired  - Assess patient's need for assistive devices and provide as appropriate  - Encourage maximum independence but intervene and supervise when necessary  - Involve family in performance of ADLs  - Assess for home care needs following discharge   - Consider OT consult to assist with ADL evaluation and planning for discharge  - Provide patient education as appropriate  Outcome: Progressing  Goal: Maintains/Returns to pre admission functional level  Description: INTERVENTIONS:  - Perform AM-PAC 6 Click Basic Mobility/ Daily Activity assessment daily.  - Set and communicate daily mobility goal to care team and patient/family/caregiver.   - Collaborate with rehabilitation services on mobility goals if consulted  - Perform Range of Motion   - Reposition patient   - Dangle patient   - Stand patient  - Ambulate patient   - Out of bed to chair    - Out of bed for meals   - Out of bed for toileting  - Record patient progress and toleration of activity level   Outcome: Progressing     Problem: Knowledge Deficit  Goal: Patient/family/caregiver demonstrates understanding of disease process, treatment plan, medications, and discharge instructions  Description: Complete learning assessment and assess knowledge base.  Interventions:  - Provide teaching at level of understanding  - Provide  teaching via preferred learning methods  Outcome: Progressing     Problem: DISCHARGE PLANNING  Goal: Discharge to home or other facility with appropriate resources  Description: INTERVENTIONS:  - Identify barriers to discharge w/patient and caregiver  - Arrange for needed discharge resources and transportation as appropriate  - Identify discharge learning needs (meds, wound care, etc.)  - Arrange for interpretive services to assist at discharge as needed  - Refer to Case Management Department for coordinating discharge planning if the patient needs post-hospital services based on physician/advanced practitioner order or complex needs related to functional status, cognitive ability, or social support system  Outcome: Progressing     Problem: POSTPARTUM  Goal: Experiences normal postpartum course  Description: INTERVENTIONS:  - Monitor maternal vital signs  - Assess uterine involution and lochia  Outcome: Progressing  Goal: Appropriate maternal -  bonding  Description: INTERVENTIONS:  - Identify family support  - Assess for appropriate maternal/infant bonding   -Encourage maternal/infant bonding opportunities  - Referral to  or  as needed  Outcome: Progressing  Goal: Establishment of infant feeding pattern  Description: INTERVENTIONS:  - Assess breast/bottle feeding  - Refer to lactation as needed  Outcome: Progressing  Goal: Incision(s), wounds(s) or drain site(s) healing without S/S of infection  Description: INTERVENTIONS  - Assess and document dressing, incision, wound bed, drain sites and surrounding tissue  - Provide patient and family education  - Perform skin care/dressing changes  Outcome: Progressing

## 2024-12-27 NOTE — LACTATION NOTE
This note was copied from a baby's chart.  CONSULT - LACTATION  Baby Boy (Butch Morrison 1 days male MRN: [id number]    [facility] AN NURSERY Room / Bed: (N)/(N) Encounter: [id number]    Maternal Information     MOTHER:  Savannah Morrison  Maternal Age: 34 y.o.  OB History: # 1 - Date: 24, Sex: Male, Weight: 3415 g (7 lb 8.5 oz), GA: 39w2d, Type: , Low Transverse, Apgar1: 4, Apgar5: 9, Living: Living, Birth Comments: None   Previouse breast reduction surgery? No    Lactation history:   Has patient previously breast fed: No   How long had patient previously breast fed:     Previous breast feeding complications:       Past Surgical History:   Procedure Laterality Date    TONSILECTOMY AND ADNOIDECTOMY      age 9       Birth information:  YOB: 2022   Time of birth: 8:48 AM   Sex: male   Delivery type: , Low Transverse   Birth Weight: 3415 g (7 lb 8.5 oz)   Percent of Weight Change: -2%     Gestational Age: 39w2d   [unfilled]    Assessment     Breast and nipple assessment:  no clinical exam       24 0815   Lactation Consultation   Reason for Consult 20 m   Lactation Consultant Total Time 20   Maternal Information   Has mother  before? No   Infant to breast within first hour of birth? No   Breastfeeding Delayed Due to Maternal status   Breasts/Nipples   Intervention Hand expression   Breastfeeding Progress Not yet established   Breast Pump   Pump 3  (has pump through insurance)   Patient Follow-Up   Lactation Consult Status 2   Follow-Up Type Inpatient;Call as needed   Other OB Lactation Documentation    Additional Problem Noted F/up: Mom states breastfeeding is going well.Mom concerned baby was feeding for 30-50 minutes. Education given to mom regarding length of feeds and frequency of feedings. Education on cluster feedings. Enc to call for latch assessment.       Feeding recommendations:  breast feed on demand    F/up: Mom  states breastfeeding is going well.Mom concerned baby was feeding for 30-50 minutes. Education given to mom regarding length of feeds and frequency of feedings. Education on cluster feedings. Enc to call for latch assessment.    Discussed 2nd night syndrome and ways to calm infant. Hand out given. Information on hand expression given. Discussed benefits of knowing how to manually express breast including stimulating milk supply, softening nipple for latch and evacuating breast in the event of engorgement.    Encouraged parents to call for assistance, questions, and concerns about breastfeeding.  Extension provided.      Christiana Bush MA 12/27/2024 8:55 AM

## 2024-12-28 PROCEDURE — 99024 POSTOP FOLLOW-UP VISIT: CPT | Performed by: STUDENT IN AN ORGANIZED HEALTH CARE EDUCATION/TRAINING PROGRAM

## 2024-12-28 RX ADMIN — IBUPROFEN 600 MG: 600 TABLET, FILM COATED ORAL at 14:50

## 2024-12-28 RX ADMIN — DOCUSATE SODIUM 100 MG: 100 CAPSULE, LIQUID FILLED ORAL at 08:53

## 2024-12-28 RX ADMIN — ACETAMINOPHEN 650 MG: 325 TABLET, FILM COATED ORAL at 22:29

## 2024-12-28 RX ADMIN — IBUPROFEN 600 MG: 600 TABLET, FILM COATED ORAL at 22:29

## 2024-12-28 RX ADMIN — ACETAMINOPHEN 650 MG: 325 TABLET, FILM COATED ORAL at 00:38

## 2024-12-28 RX ADMIN — IBUPROFEN 600 MG: 600 TABLET, FILM COATED ORAL at 00:38

## 2024-12-28 RX ADMIN — ENOXAPARIN SODIUM 40 MG: 40 INJECTION SUBCUTANEOUS at 08:53

## 2024-12-28 RX ADMIN — DOCUSATE SODIUM 100 MG: 100 CAPSULE, LIQUID FILLED ORAL at 18:34

## 2024-12-28 RX ADMIN — ACETAMINOPHEN 650 MG: 325 TABLET, FILM COATED ORAL at 07:51

## 2024-12-28 RX ADMIN — IBUPROFEN 600 MG: 600 TABLET, FILM COATED ORAL at 07:51

## 2024-12-28 NOTE — PLAN OF CARE
Problem: POSTPARTUM  Goal: Experiences normal postpartum course  Description: INTERVENTIONS:  - Monitor maternal vital signs  - Assess uterine involution and lochia  Outcome: Progressing     Problem: POSTPARTUM  Goal: Appropriate maternal -  bonding  Description: INTERVENTIONS:  - Identify family support  - Assess for appropriate maternal/infant bonding   -Encourage maternal/infant bonding opportunities  - Referral to  or  as needed  Outcome: Progressing     Problem: POSTPARTUM  Goal: Establishment of infant feeding pattern  Description: INTERVENTIONS:  - Assess breast/bottle feeding  - Refer to lactation as needed  Outcome: Progressing     Problem: PAIN - ADULT  Goal: Verbalizes/displays adequate comfort level or baseline comfort level  Description: Interventions:  - Encourage patient to monitor pain and request assistance  - Assess pain using appropriate pain scale  - Administer analgesics based on type and severity of pain and evaluate response  - Implement non-pharmacological measures as appropriate and evaluate response  - Consider cultural and social influences on pain and pain management  - Notify physician/advanced practitioner if interventions unsuccessful or patient reports new pain  Outcome: Progressing     Problem: INFECTION - ADULT  Goal: Absence or prevention of progression during hospitalization  Description: INTERVENTIONS:  - Assess and monitor for signs and symptoms of infection  - Monitor lab/diagnostic results  - Monitor all insertion sites, i.e. indwelling lines, tubes, and drains  - Monitor endotracheal if appropriate and nasal secretions for changes in amount and color  - [place] appropriate cooling/warming therapies per order  - Administer medications as ordered  - Instruct and encourage patient and family to use good hand hygiene technique  - Identify and instruct in appropriate isolation precautions for identified infection/condition  Outcome:  Progressing     Problem: Knowledge Deficit  Goal: Patient/family/caregiver demonstrates understanding of disease process, treatment plan, medications, and discharge instructions  Description: Complete learning assessment and assess knowledge base.  Interventions:  - Provide teaching at level of understanding  - Provide teaching via preferred learning methods  Outcome: Progressing     Problem: DISCHARGE PLANNING  Goal: Discharge to home or other facility with appropriate resources  Description: INTERVENTIONS:  - Identify barriers to discharge w/patient and caregiver  - Arrange for needed discharge resources and transportation as appropriate  - Identify discharge learning needs (meds, wound care, etc.)  - Arrange for interpretive services to assist at discharge as needed  - Refer to Case Management Department for coordinating discharge planning if the patient needs post-hospital services based on physician/advanced practitioner order or complex needs related to functional status, cognitive ability, or social support system  Outcome: Progressing

## 2024-12-28 NOTE — PROGRESS NOTES
Progress Note - OB/GYN   Savannah Morrison 34 y.o. female MRN: [id number]  Unit/Bed#: -01 Encounter: [id number]      Assessment/Plan    Savannah Morrison is a 34 y.o.  who is POD #2 s/p 1LTCS (breech) 39w2d     Psoriatic arthritis (HCC)  Assessment & Plan  Uses clobetasol and hydrocortisone at home, continue as needed    * Status post primary low transverse  section  Assessment & Plan  QBL: 401cc; admission Hb 12.1g/dL -> 9.7, Venofer ordered. Patient progressing toward postoperative milestones.  - Continue routine postoperative care  - Pain management with oral analgesics  - DVT Ppx: Lovenox 40mg qd; encourage ambulation  - Voiding  - Encourage breastfeeding, familial bonding           Disposition: Anticipate discharge home postpartum Day #3-4  Barriers to discharge: ongoing couplet care       Subjective/Objective     Subjective: Postpartum state    Pain: no  Tolerating PO: yes  Voiding: yes  Flatus: yes  BM: no  Ambulating: yes  Breastfeeding: Breastfeeding  Chest pain: no  Shortness of breath: no  Leg pain: no  Lochia: wnl    Objective:     Vitals:  Vitals:    24 1548 24 0038 24 0300   BP: (!) 85/60 112/72 104/57 108/70   BP Location: Left arm Right arm Right arm Right arm   Pulse: 73 85 72 67   Resp: 16  16 18   Temp: 97.8 °F (36.6 °C) 98.2 °F (36.8 °C) 98.2 °F (36.8 °C) 97.8 °F (36.6 °C)   TempSrc: Oral Oral Oral Oral   SpO2: 98% 98% 99% 100%   Weight:       Height:           Physical Exam:   GEN: appears well, alert and oriented x 3, pleasant and cooperative   CV: Regular rate  RESP: non labored breathing  ABDOMEN: soft, no tenderness, no distention, Uterine fundus firm and non-tender, -1 cm below the umbilicus, incision c/d/i  EXTREMITIES: non-tender  NEURO Alert and oriented to person, place, and time.       Lab Results   Component Value Date    WBC 13.22 (H) 2024    HGB 9.7 (L) 2024    HCT 29.5 (L) 2024    MCV 95 2024     2024          Rebecca Ricketts,   Obstetrics & Gynecology  12/28/24

## 2024-12-28 NOTE — ASSESSMENT & PLAN NOTE
QBL: 401cc; admission Hb 12.1g/dL -> 9.7, Venofer ordered. Patient progressing toward postoperative milestones.  - Continue routine postoperative care  - Pain management with oral analgesics  - DVT Ppx: Lovenox 40mg qd; encourage ambulation  - Voiding  - Encourage breastfeeding, familial bonding

## 2024-12-28 NOTE — PLAN OF CARE
Problem: PAIN - ADULT  Goal: Verbalizes/displays adequate comfort level or baseline comfort level  Description: Interventions:  - Encourage patient to monitor pain and request assistance  - Assess pain using appropriate pain scale  - Administer analgesics based on type and severity of pain and evaluate response  - Implement non-pharmacological measures as appropriate and evaluate response  - Consider cultural and social influences on pain and pain management  - Notify physician/advanced practitioner if interventions unsuccessful or patient reports new pain  Outcome: Progressing     Problem: INFECTION - ADULT  Goal: Absence or prevention of progression during hospitalization  Description: INTERVENTIONS:  - Assess and monitor for signs and symptoms of infection  - Monitor lab/diagnostic results  - Monitor all insertion sites, i.e. indwelling lines, tubes, and drains  - Monitor endotracheal if appropriate and nasal secretions for changes in amount and color  - [place] appropriate cooling/warming therapies per order  - Administer medications as ordered  - Instruct and encourage patient and family to use good hand hygiene technique  - Identify and instruct in appropriate isolation precautions for identified infection/condition  Outcome: Progressing  Goal: Absence of fever/infection during neutropenic period  Description: INTERVENTIONS:  - Monitor WBC    Outcome: Progressing     Problem: SAFETY ADULT  Goal: Patient will remain free of falls  Description: INTERVENTIONS:  - Educate patient/family on patient safety including physical limitations  - Instruct patient to call for assistance with activity   - Consult OT/PT to assist with strengthening/mobility   - Keep Call bell within reach  - Keep bed low and locked with side rails adjusted as appropriate  - Keep care items and personal belongings within reach  - Initiate and maintain comfort rounds  - Make Fall Risk Sign visible to staff  - Apply yellow socks and bracelet  for high fall risk patients  - Consider moving patient to room near nurses station  Outcome: Progressing  Goal: Maintain or return to baseline ADL function  Description: INTERVENTIONS:  -  Assess patient's ability to carry out ADLs; assess patient's baseline for ADL function and identify physical deficits which impact ability to perform ADLs (bathing, care of mouth/teeth, toileting, grooming, dressing, etc.)  - Assess/evaluate cause of self-care deficits   - Assess range of motion  - Assess patient's mobility; develop plan if impaired  - Assess patient's need for assistive devices and provide as appropriate  - Encourage maximum independence but intervene and supervise when necessary  - Involve family in performance of ADLs  - Assess for home care needs following discharge   - Consider OT consult to assist with ADL evaluation and planning for discharge  - Provide patient education as appropriate  Outcome: Progressing  Goal: Maintains/Returns to pre admission functional level  Description: INTERVENTIONS:  - Perform AM-PAC 6 Click Basic Mobility/ Daily Activity assessment daily.  - Set and communicate daily mobility goal to care team and patient/family/caregiver.   - Collaborate with rehabilitation services on mobility goals if consulted  - Out of bed for toileting  - Record patient progress and toleration of activity level   Outcome: Progressing     Problem: Knowledge Deficit  Goal: Patient/family/caregiver demonstrates understanding of disease process, treatment plan, medications, and discharge instructions  Description: Complete learning assessment and assess knowledge base.  Interventions:  - Provide teaching at level of understanding  - Provide teaching via preferred learning methods  Outcome: Progressing     Problem: DISCHARGE PLANNING  Goal: Discharge to home or other facility with appropriate resources  Description: INTERVENTIONS:  - Identify barriers to discharge w/patient and caregiver  - Arrange for needed  discharge resources and transportation as appropriate  - Identify discharge learning needs (meds, wound care, etc.)  - Arrange for interpretive services to assist at discharge as needed  - Refer to Case Management Department for coordinating discharge planning if the patient needs post-hospital services based on physician/advanced practitioner order or complex needs related to functional status, cognitive ability, or social support system  Outcome: Progressing     Problem: POSTPARTUM  Goal: Experiences normal postpartum course  Description: INTERVENTIONS:  - Monitor maternal vital signs  - Assess uterine involution and lochia  Outcome: Progressing  Goal: Appropriate maternal -  bonding  Description: INTERVENTIONS:  - Identify family support  - Assess for appropriate maternal/infant bonding   -Encourage maternal/infant bonding opportunities  - Referral to  or  as needed  Outcome: Progressing  Goal: Establishment of infant feeding pattern  Description: INTERVENTIONS:  - Assess breast/bottle feeding  - Refer to lactation as needed  Outcome: Progressing  Goal: Incision(s), wounds(s) or drain site(s) healing without S/S of infection  Description: INTERVENTIONS  - Assess and document dressing, incision, wound bed, drain sites and surrounding tissue  - Provide patient and family education  Outcome: Progressing

## 2024-12-29 VITALS
HEART RATE: 77 BPM | RESPIRATION RATE: 18 BRPM | OXYGEN SATURATION: 98 % | TEMPERATURE: 97.8 F | DIASTOLIC BLOOD PRESSURE: 80 MMHG | HEIGHT: 62 IN | WEIGHT: 178 LBS | BODY MASS INDEX: 32.76 KG/M2 | SYSTOLIC BLOOD PRESSURE: 110 MMHG

## 2024-12-29 PROCEDURE — NC001 PR NO CHARGE: Performed by: OBSTETRICS & GYNECOLOGY

## 2024-12-29 PROCEDURE — 99024 POSTOP FOLLOW-UP VISIT: CPT | Performed by: OBSTETRICS & GYNECOLOGY

## 2024-12-29 RX ORDER — OXYCODONE HYDROCHLORIDE 5 MG/1
5 TABLET ORAL EVERY 4 HOURS PRN
Qty: 10 TABLET | Refills: 0 | Status: SHIPPED | OUTPATIENT
Start: 2024-12-29

## 2024-12-29 RX ORDER — ACETAMINOPHEN 325 MG/1
975 TABLET ORAL EVERY 6 HOURS PRN
Start: 2024-12-29

## 2024-12-29 RX ORDER — IBUPROFEN 600 MG/1
600 TABLET, FILM COATED ORAL EVERY 6 HOURS
Qty: 30 TABLET | Refills: 0 | Status: SHIPPED | OUTPATIENT
Start: 2024-12-29

## 2024-12-29 RX ORDER — SIMETHICONE 80 MG
80 TABLET,CHEWABLE ORAL 4 TIMES DAILY PRN
Qty: 30 TABLET | Refills: 0 | Status: SHIPPED | OUTPATIENT
Start: 2024-12-29

## 2024-12-29 RX ORDER — DOCUSATE SODIUM 100 MG/1
100 CAPSULE, LIQUID FILLED ORAL 2 TIMES DAILY
Qty: 60 CAPSULE | Refills: 0 | Status: SHIPPED | OUTPATIENT
Start: 2024-12-29

## 2024-12-29 RX ADMIN — ENOXAPARIN SODIUM 40 MG: 40 INJECTION SUBCUTANEOUS at 09:19

## 2024-12-29 RX ADMIN — IBUPROFEN 600 MG: 600 TABLET, FILM COATED ORAL at 09:19

## 2024-12-29 RX ADMIN — DOCUSATE SODIUM 100 MG: 100 CAPSULE, LIQUID FILLED ORAL at 09:19

## 2024-12-29 RX ADMIN — ACETAMINOPHEN 650 MG: 325 TABLET, FILM COATED ORAL at 11:38

## 2024-12-29 RX ADMIN — ACETAMINOPHEN 650 MG: 325 TABLET, FILM COATED ORAL at 05:56

## 2024-12-29 NOTE — LACTATION NOTE
This note was copied from a baby's chart.  Follow Up Lactation    Met with Savannah who is breastfeeding her baby boy. She reports that her nipples are tender. Upon examination, Savannah has nipple scabbing on both nipple faces. Baby boy was crying upon entering the room. Baby boy has visible suck blisters on his lips. Encouraged feeding baby. Discussed cluster feeding as baby just nursed on one breast for 45 minutes. Encouraged switching breasts after 30 minutes. Savannah latched baby independently, but the latch was visibly shallow. With verbal redirection, Savannah was able to break the suction of the latch and guide baby onto the breast more deeply. Minimal hand over hand support was needed. Discussed properties of a deep latch versus a shallow latch. Education on nipple care. Encouraged follow up with the [facility]. Parents have no further questions at this time.

## 2024-12-29 NOTE — PROGRESS NOTES
Progress Note - OB/GYN   Savannah Morrison 34 y.o. female MRN: [id number]  Unit/Bed#: -01 Encounter: [id number]      Assessment/Plan    Savannah Morrison is a 34 y.o.  who is POD #3 s/p 1LTCS (breech) 39w2d     * Status post primary low transverse  section  Assessment & Plan  QBL: 401cc; admission Hb 12.1g/dL -> 9.7, s/p Venofer  - Patient progressing toward postoperative milestones.  - Continue routine postoperative care  - Pain management with oral analgesics  - DVT Ppx: Lovenox 40mg qd; encourage ambulation  - Voiding  - Encourage breastfeeding, familial bonding           Disposition: Anticipate discharge home postpartum Day #3-4  Barriers to discharge: ongoing couplet care       Subjective/Objective     Subjective: Postpartum state    Pain: no  Tolerating PO: yes  Voiding: yes  Flatus: yes  BM: no  Ambulating: yes  Breastfeeding: Breastfeeding  Chest pain: no  Shortness of breath: no  Leg pain: no  Lochia: wnl    Objective:     Vitals:  Vitals:    24 0902 24 1728 243 24 0009   BP: 100/67 114/72 111/61 120/73   BP Location:  Right arm Left arm Left arm   Pulse: 75 72 78 74   Resp: 18 16 16 16   Temp: 97.8 °F (36.6 °C) 98.1 °F (36.7 °C) 97.9 °F (36.6 °C) 97.8 °F (36.6 °C)   TempSrc: Oral Oral Oral Oral   SpO2: 98%   99%   Weight:       Height:           Physical Exam:   GEN: appears well, alert and oriented x 3, pleasant and cooperative   CV: Regular rate  RESP: non labored breathing  ABDOMEN: soft, no tenderness, no distention, Uterine fundus firm and non-tender, -1 cm below the umbilicus, incision c/d/i  EXTREMITIES: non-tender  NEURO Alert and oriented to person, place, and time.       Lab Results   Component Value Date    WBC 13.22 (H) 2024    HGB 9.7 (L) 2024    HCT 29.5 (L) 2024    MCV 95 2024     2024         Tammy Villa MD  Obstetrics & Gynecology  24   [FreeTextEntry1] : Patient with moderate to severe peripheral vascular disease, status post left SFA stent placement. Patient doing well. Continue antiplatelet therapy.  No evidence of DVT or phlebitis.  Plan for PVRs next week.\par \par No CVA or TIA.  Endarterectomy is widely patent.  Contralateral carotid stenosis.  Follow-up in 6 months.

## 2024-12-29 NOTE — ASSESSMENT & PLAN NOTE
QBL: 401cc; admission Hb 12.1g/dL -> 9.7, s/p Venofer  - Patient progressing toward postoperative milestones.  - Continue routine postoperative care  - Pain management with oral analgesics  - DVT Ppx: Lovenox 40mg qd; encourage ambulation  - Voiding  - Encourage breastfeeding, familial bonding

## 2024-12-29 NOTE — PLAN OF CARE
Problem: PAIN - ADULT  Goal: Verbalizes/displays adequate comfort level or baseline comfort level  Description: Interventions:  - Encourage patient to monitor pain and request assistance  - Assess pain using appropriate pain scale  - Administer analgesics based on type and severity of pain and evaluate response  - Implement non-pharmacological measures as appropriate and evaluate response  - Consider cultural and social influences on pain and pain management  - Notify physician/advanced practitioner if interventions unsuccessful or patient reports new pain  12/29/2024 0816 by Brandt Santa RN  Outcome: Completed  12/29/2024 0816 by Brandt Santa RN  Outcome: Progressing     Problem: INFECTION - ADULT  Goal: Absence or prevention of progression during hospitalization  Description: INTERVENTIONS:  - Assess and monitor for signs and symptoms of infection  - Monitor lab/diagnostic results  - Monitor all insertion sites, i.e. indwelling lines, tubes, and drains  - Monitor endotracheal if appropriate and nasal secretions for changes in amount and color  - [place] appropriate cooling/warming therapies per order  - Administer medications as ordered  - Instruct and encourage patient and family to use good hand hygiene technique  - Identify and instruct in appropriate isolation precautions for identified infection/condition  12/29/2024 0816 by Brandt Santa RN  Outcome: Completed  12/29/2024 0816 by Brandt Santa RN  Outcome: Progressing  Goal: Absence of fever/infection during neutropenic period  Description: INTERVENTIONS:  - Monitor WBC    12/29/2024 0816 by Brandt Santa RN  Outcome: Completed  12/29/2024 0816 by Brandt Santa RN  Outcome: Progressing     Problem: SAFETY ADULT  Goal: Patient will remain free of falls  Description: INTERVENTIONS:  - Educate patient/family on patient safety including physical limitations  - Instruct patient to call for assistance with activity   - Consult OT/PT to assist with  strengthening/mobility   - Keep Call bell within reach  - Keep bed low and locked with side rails adjusted as appropriate  - Keep care items and personal belongings within reach  - Initiate and maintain comfort rounds  - Make Fall Risk Sign visible to staff  - Apply yellow socks and bracelet for high fall risk patients  - Consider moving patient to room near nurses station  12/29/2024 0816 by Brandt Santa RN  Outcome: Completed  12/29/2024 0816 by Brandt Santa RN  Outcome: Progressing  Goal: Maintain or return to baseline ADL function  Description: INTERVENTIONS:  -  Assess patient's ability to carry out ADLs; assess patient's baseline for ADL function and identify physical deficits which impact ability to perform ADLs (bathing, care of mouth/teeth, toileting, grooming, dressing, etc.)  - Assess/evaluate cause of self-care deficits   - Assess range of motion  - Assess patient's mobility; develop plan if impaired  - Assess patient's need for assistive devices and provide as appropriate  - Encourage maximum independence but intervene and supervise when necessary  - Involve family in performance of ADLs  - Assess for home care needs following discharge   - Consider OT consult to assist with ADL evaluation and planning for discharge  - Provide patient education as appropriate  12/29/2024 0816 by Brandt Santa RN  Outcome: Completed  12/29/2024 0816 by Brandt Santa RN  Outcome: Progressing  Goal: Maintains/Returns to pre admission functional level  Description: INTERVENTIONS:  - Perform AM-PAC 6 Click Basic Mobility/ Daily Activity assessment daily.  - Set and communicate daily mobility goal to care team and patient/family/caregiver.   - Collaborate with rehabilitation services on mobility goals if consulted  - Out of bed for toileting  - Record patient progress and toleration of activity level   12/29/2024 0816 by Brandt Santa RN  Outcome: Completed  12/29/2024 0816 by Brandt Santa RN  Outcome: Progressing      Problem: Knowledge Deficit  Goal: Patient/family/caregiver demonstrates understanding of disease process, treatment plan, medications, and discharge instructions  Description: Complete learning assessment and assess knowledge base.  Interventions:  - Provide teaching at level of understanding  - Provide teaching via preferred learning methods  2024 by Brandt Santa RN  Outcome: Completed  2024 by Brandt Santa RN  Outcome: Progressing     Problem: DISCHARGE PLANNING  Goal: Discharge to home or other facility with appropriate resources  Description: INTERVENTIONS:  - Identify barriers to discharge w/patient and caregiver  - Arrange for needed discharge resources and transportation as appropriate  - Identify discharge learning needs (meds, wound care, etc.)  - Arrange for interpretive services to assist at discharge as needed  - Refer to Case Management Department for coordinating discharge planning if the patient needs post-hospital services based on physician/advanced practitioner order or complex needs related to functional status, cognitive ability, or social support system  2024 by Brandt Santa RN  Outcome: Completed  2024 by Brandt Santa RN  Outcome: Progressing     Problem: POSTPARTUM  Goal: Experiences normal postpartum course  Description: INTERVENTIONS:  - Monitor maternal vital signs  - Assess uterine involution and lochia  2024 by Brandt Santa RN  Outcome: Completed  2024 by Brandt Santa RN  Outcome: Progressing  Goal: Appropriate maternal -  bonding  Description: INTERVENTIONS:  - Identify family support  - Assess for appropriate maternal/infant bonding   -Encourage maternal/infant bonding opportunities  - Referral to  or  as needed  2024 by Brandt Santa RN  Outcome: Completed  2024 by Brandt Santa RN  Outcome: Progressing  Goal: Establishment of infant feeding  pattern  Description: INTERVENTIONS:  - Assess breast/bottle feeding  - Refer to lactation as needed  12/29/2024 0816 by Brandt Santa RN  Outcome: Completed  12/29/2024 0816 by Brandt Santa RN  Outcome: Progressing  Goal: Incision(s), wounds(s) or drain site(s) healing without S/S of infection  Description: INTERVENTIONS  - Assess and document dressing, incision, wound bed, drain sites and surrounding tissue  - Provide patient and family education  12/29/2024 0816 by Brandt Santa RN  Outcome: Completed  12/29/2024 0816 by Brandt Santa RN  Outcome: Progressing

## 2024-12-29 NOTE — PLAN OF CARE
Problem: PAIN - ADULT  Goal: Verbalizes/displays adequate comfort level or baseline comfort level  Description: Interventions:  - Encourage patient to monitor pain and request assistance  - Assess pain using appropriate pain scale  - Administer analgesics based on type and severity of pain and evaluate response  - Implement non-pharmacological measures as appropriate and evaluate response  - Consider cultural and social influences on pain and pain management  - Notify physician/advanced practitioner if interventions unsuccessful or patient reports new pain  Outcome: Progressing     Problem: INFECTION - ADULT  Goal: Absence or prevention of progression during hospitalization  Description: INTERVENTIONS:  - Assess and monitor for signs and symptoms of infection  - Monitor lab/diagnostic results  - Monitor all insertion sites, i.e. indwelling lines, tubes, and drains  - Monitor endotracheal if appropriate and nasal secretions for changes in amount and color  - [place] appropriate cooling/warming therapies per order  - Administer medications as ordered  - Instruct and encourage patient and family to use good hand hygiene technique  - Identify and instruct in appropriate isolation precautions for identified infection/condition  Outcome: Progressing  Goal: Absence of fever/infection during neutropenic period  Description: INTERVENTIONS:  - Monitor WBC    Outcome: Progressing     Problem: SAFETY ADULT  Goal: Patient will remain free of falls  Description: INTERVENTIONS:  - Educate patient/family on patient safety including physical limitations  - Instruct patient to call for assistance with activity   - Consult OT/PT to assist with strengthening/mobility   - Keep Call bell within reach  - Keep bed low and locked with side rails adjusted as appropriate  - Keep care items and personal belongings within reach  - Initiate and maintain comfort rounds  - Make Fall Risk Sign visible to staff  - Apply yellow socks and bracelet  for high fall risk patients  - Consider moving patient to room near nurses station  Outcome: Progressing  Goal: Maintain or return to baseline ADL function  Description: INTERVENTIONS:  -  Assess patient's ability to carry out ADLs; assess patient's baseline for ADL function and identify physical deficits which impact ability to perform ADLs (bathing, care of mouth/teeth, toileting, grooming, dressing, etc.)  - Assess/evaluate cause of self-care deficits   - Assess range of motion  - Assess patient's mobility; develop plan if impaired  - Assess patient's need for assistive devices and provide as appropriate  - Encourage maximum independence but intervene and supervise when necessary  - Involve family in performance of ADLs  - Assess for home care needs following discharge   - Consider OT consult to assist with ADL evaluation and planning for discharge  - Provide patient education as appropriate  Outcome: Progressing  Goal: Maintains/Returns to pre admission functional level  Description: INTERVENTIONS:  - Perform AM-PAC 6 Click Basic Mobility/ Daily Activity assessment daily.  - Set and communicate daily mobility goal to care team and patient/family/caregiver.   - Collaborate with rehabilitation services on mobility goals if consulted  - Out of bed for toileting  - Record patient progress and toleration of activity level   Outcome: Progressing     Problem: Knowledge Deficit  Goal: Patient/family/caregiver demonstrates understanding of disease process, treatment plan, medications, and discharge instructions  Description: Complete learning assessment and assess knowledge base.  Interventions:  - Provide teaching at level of understanding  - Provide teaching via preferred learning methods  Outcome: Progressing     Problem: DISCHARGE PLANNING  Goal: Discharge to home or other facility with appropriate resources  Description: INTERVENTIONS:  - Identify barriers to discharge w/patient and caregiver  - Arrange for needed  discharge resources and transportation as appropriate  - Identify discharge learning needs (meds, wound care, etc.)  - Arrange for interpretive services to assist at discharge as needed  - Refer to Case Management Department for coordinating discharge planning if the patient needs post-hospital services based on physician/advanced practitioner order or complex needs related to functional status, cognitive ability, or social support system  Outcome: Progressing     Problem: POSTPARTUM  Goal: Experiences normal postpartum course  Description: INTERVENTIONS:  - Monitor maternal vital signs  - Assess uterine involution and lochia  Outcome: Progressing  Goal: Appropriate maternal -  bonding  Description: INTERVENTIONS:  - Identify family support  - Assess for appropriate maternal/infant bonding   -Encourage maternal/infant bonding opportunities  - Referral to  or  as needed  Outcome: Progressing  Goal: Establishment of infant feeding pattern  Description: INTERVENTIONS:  - Assess breast/bottle feeding  - Refer to lactation as needed  Outcome: Progressing  Goal: Incision(s), wounds(s) or drain site(s) healing without S/S of infection  Description: INTERVENTIONS  - Assess and document dressing, incision, wound bed, drain sites and surrounding tissue  - Provide patient and family education  Outcome: Progressing

## 2024-12-29 NOTE — PLAN OF CARE
Problem: PAIN - ADULT  Goal: Verbalizes/displays adequate comfort level or baseline comfort level  Description: Interventions:  - Encourage patient to monitor pain and request assistance  - Assess pain using appropriate pain scale  - Administer analgesics based on type and severity of pain and evaluate response  - Implement non-pharmacological measures as appropriate and evaluate response  - Consider cultural and social influences on pain and pain management  - Notify physician/advanced practitioner if interventions unsuccessful or patient reports new pain  Outcome: Progressing     Problem: INFECTION - ADULT  Goal: Absence or prevention of progression during hospitalization  Description: INTERVENTIONS:  - Assess and monitor for signs and symptoms of infection  - Monitor lab/diagnostic results  - Monitor all insertion sites, i.e. indwelling lines, tubes, and drains  - Monitor endotracheal if appropriate and nasal secretions for changes in amount and color  - [place] appropriate cooling/warming therapies per order  - Administer medications as ordered  - Instruct and encourage patient and family to use good hand hygiene technique  - Identify and instruct in appropriate isolation precautions for identified infection/condition  Outcome: Progressing  Goal: Absence of fever/infection during neutropenic period  Description: INTERVENTIONS:  - Monitor WBC    Outcome: Progressing     Problem: SAFETY ADULT  Goal: Patient will remain free of falls  Description: INTERVENTIONS:  - Educate patient/family on patient safety including physical limitations  - Instruct patient to call for assistance with activity   - Consult OT/PT to assist with strengthening/mobility   - Keep Call bell within reach  - Keep bed low and locked with side rails adjusted as appropriate  - Keep care items and personal belongings within reach  - Initiate and maintain comfort rounds  - Make Fall Risk Sign visible to staff  - Offer Toileting every  Hours,  in advance of need  - Initiate/Maintain alarm  - Obtain necessary fall risk management equipment:   - Apply yellow socks and bracelet for high fall risk patients  - Consider moving patient to room near nurses station  Outcome: Progressing  Goal: Maintain or return to baseline ADL function  Description: INTERVENTIONS:  -  Assess patient's ability to carry out ADLs; assess patient's baseline for ADL function and identify physical deficits which impact ability to perform ADLs (bathing, care of mouth/teeth, toileting, grooming, dressing, etc.)  - Assess/evaluate cause of self-care deficits   - Assess range of motion  - Assess patient's mobility; develop plan if impaired  - Assess patient's need for assistive devices and provide as appropriate  - Encourage maximum independence but intervene and supervise when necessary  - Involve family in performance of ADLs  - Assess for home care needs following discharge   - Consider OT consult to assist with ADL evaluation and planning for discharge  - Provide patient education as appropriate  Outcome: Progressing  Goal: Maintains/Returns to pre admission functional level  Description: INTERVENTIONS:  - Perform AM-PAC 6 Click Basic Mobility/ Daily Activity assessment daily.  - Set and communicate daily mobility goal to care team and patient/family/caregiver.   - Collaborate with rehabilitation services on mobility goals if consulted  - Perform Range of Motion  times a day.  - Reposition patient every  hours.  - Dangle patient  times a day  - Stand patient  times a day  - Ambulate patient times a day  - Out of bed to chair  times a day   - Out of bed for meals  times a day  - Out of bed for toileting  - Record patient progress and toleration of activity level   Outcome: Progressing     Problem: Knowledge Deficit  Goal: Patient/family/caregiver demonstrates understanding of disease process, treatment plan, medications, and discharge instructions  Description: Complete learning  assessment and assess knowledge base.  Interventions:  - Provide teaching at level of understanding  - Provide teaching via preferred learning methods  Outcome: Progressing     Problem: DISCHARGE PLANNING  Goal: Discharge to home or other facility with appropriate resources  Description: INTERVENTIONS:  - Identify barriers to discharge w/patient and caregiver  - Arrange for needed discharge resources and transportation as appropriate  - Identify discharge learning needs (meds, wound care, etc.)  - Arrange for interpretive services to assist at discharge as needed  - Refer to Case Management Department for coordinating discharge planning if the patient needs post-hospital services based on physician/advanced practitioner order or complex needs related to functional status, cognitive ability, or social support system  Outcome: Progressing     Problem: POSTPARTUM  Goal: Experiences normal postpartum course  Description: INTERVENTIONS:  - Monitor maternal vital signs  - Assess uterine involution and lochia  Outcome: Progressing  Goal: Appropriate maternal -  bonding  Description: INTERVENTIONS:  - Identify family support  - Assess for appropriate maternal/infant bonding   -Encourage maternal/infant bonding opportunities  - Referral to  or  as needed  Outcome: Progressing  Goal: Establishment of infant feeding pattern  Description: INTERVENTIONS:  - Assess breast/bottle feeding  - Refer to lactation as needed  Outcome: Progressing  Goal: Incision(s), wounds(s) or drain site(s) healing without S/S of infection  Description: INTERVENTIONS  - Assess and document dressing, incision, wound bed, drain sites and surrounding tissue  - Provide patient and family education  - Perform skin care/dressing changes every   Outcome: Progressing

## 2024-12-31 ENCOUNTER — NURSE TRIAGE (OUTPATIENT)
Age: 34
End: 2024-12-31

## 2024-12-31 ENCOUNTER — HOSPITAL ENCOUNTER (OUTPATIENT)
Dept: NON INVASIVE DIAGNOSTICS | Facility: HOSPITAL | Age: 34
Discharge: HOME | End: 2024-12-31
Attending: STUDENT IN AN ORGANIZED HEALTH CARE EDUCATION/TRAINING PROGRAM
Payer: COMMERCIAL

## 2024-12-31 PROCEDURE — 93971 EXTREMITY STUDY: CPT

## 2024-12-31 NOTE — TELEPHONE ENCOUNTER
"Patient is calling 5 days PP s/p C/S with Right leg swelling. Notes left left swelling improved but right leg has become more swollen since delivery - more in foot/ankle. Denies redness, warmth, pain, streaking, chest pain, SOB, HA, vision changes, or abdominal pain. Has been wearing compression socks x 2 days with no improvement.     ESC to Dr. Garcia who advises Stat doppler today and if unable to accommodate - ED eval.     Order for sat RLE doppler order - patient to call central scheduling for appointment today. If unable to get in today should go to ED for eval per Dr. Garcia.     Left message for patient requesting call back to review response per Dr. Garcia.    Spoke with patient upon 2nd attempt - reviewed message per Dr. Garcia. Patient will contact central scheduling to make appointment for today.     Reason for Disposition   [1] Thigh, calf, or ankle swelling AND [2] only 1 side    Answer Assessment - Initial Assessment Questions  1. ONSET: \"When did the swelling start?\" (e.g., minutes, hours, days)      yesterday  2. LOCATION: \"What part of the leg is swollen?\"  \"Are both legs swollen or just one leg?\"      Right leg - more foot/ankle  3. SEVERITY: \"How bad is the swelling?\" (e.g., localized; mild, moderate, severe)      Non-pitting  4. REDNESS: \"Does the swelling look red or infected?\"      Denies  5. PAIN: \"Is the swelling painful to touch?\" If Yes, ask: \"How painful is it?\"   (Scale 1-10; mild, moderate or severe)      Denies  6. FEVER: \"Do you have a fever?\" If Yes, ask: \"What is it, how was it measured, and when did it start?\"       Denies  7. MEDICAL HISTORY: \"Do you have a history of blood clots, heart failure, kidney disease, liver failure, or preeclampsia?\"      Denies   8. OTHER SYMPTOMS: \"Do you have any other symptoms?\" (e.g., chest pain, difficulty breathing)      Denies chest pain, SOB, abd pain, HA, vision changes  9. DELIVERY DATE: \"When was your delivery date?\" \"Vaginal delivery " "or ?\"          Protocols used: Postpartum - Leg Swelling and Edema-Adult-AH    "

## 2024-12-31 NOTE — ANESTHESIA POSTPROCEDURE EVALUATION
Post-Op Assessment Note    CV Status:  Stable  Pain Score: 0    Pain management: adequate       Mental Status:  Awake and sleepy   Hydration Status:  Stable   PONV Controlled:  None   Airway Patency:  Patent     Post Op Vitals Reviewed: Yes    No anethesia notable event occurred.    Staff: Anesthesiologist           Last Filed PACU Vitals:  Vitals Value Taken Time   Temp 98.3 °F (36.8 °C) 12/26/24 1045   Pulse 74 12/26/24 1115   /64 12/26/24 1115   Resp 16 12/26/24 1045   SpO2 99 % 12/26/24 1000       Modified Ted:  No data recorded

## 2025-01-01 LAB — PLACENTA IN STORAGE: NORMAL

## 2025-01-01 PROCEDURE — 93971 EXTREMITY STUDY: CPT | Performed by: SURGERY

## 2025-01-02 ENCOUNTER — RESULTS FOLLOW-UP (OUTPATIENT)
Dept: OBGYN CLINIC | Facility: CLINIC | Age: 35
End: 2025-01-02

## 2025-01-03 ENCOUNTER — TELEPHONE (OUTPATIENT)
Dept: OBGYN CLINIC | Facility: CLINIC | Age: 35
End: 2025-01-03

## 2025-01-03 NOTE — TELEPHONE ENCOUNTER
POSTPARTUM PHONE CALL ASSESSMENT - left message patient's VM & MyChart message sent 1/3/2025    Date of Delivery: 2024  Delivering Provider: Dr Lamar Urrutia  Mode:   Delivery Notes/Complications:     Do you still have bleeding/pain? If so, how much/how severe?     Regular BMs/Urination?     Breastfeeding/Formula/Both?     How are you doing emotionally?      Do you have any other questions or concerns for us or your provider?     Have you scheduled the pediatrician appointment with pediatrician?     Do you have a postpartum visit scheduled? yes  Incision check 2025   (APRIL Chisholm)

## 2025-01-06 NOTE — PROGRESS NOTES
"Postpartum Visit    25      Subjective     Savannah Morrison is a 34 y.o.  female who presents for a incision check. She had a  delivery on 2024 @ 39w2d.  Complications included none.    She delivered a male .  Baby's course has been uncomplicated.   Baby is feeding by pumping, breastfeeding, and supplementing with formula.     Gestational Diabetes: denies  Gestational HTN/Preeclampsia: denies  Pregnancy Complications: breech presentation, psoriasis, psoriatic arthritis and depression.     Lochia is moderate lochia.   Bowel function is normal.   Bladder function is normal.   Chest pain: denies  Shortness of breath:denies  Leg pain: denies  S  he reports 2-3 episodes of seeing \"starts\" lasting for 1-2 minutes. No headache, dizziness, syncope associated. BP is normotensive. Has similar symptoms in pregnancy with negative pre-eclamptic labs. She believes symptoms is associated with dehydration.     She has history of depression and PTSD. Not currently medicated. Reporting increasing anxiety. She denies SI/HI.     The following portions of the patient's history were reviewed and updated as appropriate: allergies, current medications, past family history, past medical history, past social history, past surgical history, and problem list.      Current Outpatient Medications:     acetaminophen (TYLENOL) 325 mg tablet, Take 3 tablets (975 mg total) by mouth every 6 (six) hours as needed for mild pain for up to 3 doses, Disp: , Rfl:     clobetasol (TEMOVATE) 0.05 % external solution, Apply topically 2 (two) times a day For scalp psoriasis, Disp: 50 mL, Rfl: 6    clobetasol (TEMOVATE) 0.05 % ointment, Apply topically 2 (two) times a day For body psoriasis, Disp: 60 g, Rfl: 5    docusate sodium (COLACE) 100 mg capsule, Take 1 capsule (100 mg total) by mouth 2 (two) times a day, Disp: 60 capsule, Rfl: 0    hydrocortisone 2.5 % ointment, Apply topically 2 (two) times a day For face psoriasis, Disp: 30 " g, Rfl: 6    ibuprofen (MOTRIN) 600 mg tablet, Take 1 tablet (600 mg total) by mouth every 6 (six) hours, Disp: 30 tablet, Rfl: 0    Omega-3 Fatty Acids (OMEGA 3 PO), Take by mouth, Disp: , Rfl:     ondansetron (ZOFRAN) 4 mg tablet, Take 1 tablet (4 mg total) by mouth every 8 (eight) hours as needed for nausea or vomiting (Patient not taking: Reported on 1/7/2025), Disp: 30 tablet, Rfl: 1    oxyCODONE (Roxicodone) 5 immediate release tablet, Take 1 tablet (5 mg total) by mouth every 4 (four) hours as needed for severe pain for up to 10 doses Max Daily Amount: 30 mg (Patient not taking: Reported on 1/7/2025), Disp: 10 tablet, Rfl: 0    Prenatal Vit-Fe Fumarate-FA (Prenatal/Folic Acid) TABS, Take 1 tablet by mouth daily, Disp: 30 tablet, Rfl: 11    Pyridoxine HCl (vitamin B-6) 25 MG tablet, , Disp: , Rfl:     simethicone (MYLICON) 80 mg chewable tablet, Chew 1 tablet (80 mg total) 4 (four) times a day as needed for flatulence (Patient not taking: Reported on 1/7/2025), Disp: 30 tablet, Rfl: 0    Allergies   Allergen Reactions    Sulfa Antibiotics Hives       Review of Systems  Constitutional: no fever, feels well  Breasts: no complaints of breast pain, breast lump, or nipple discharge  Gastrointestinal: no complaints nausea, vomiting  Incision: pain while moving.   Neurological: no complaints of headache. (+) blurry vision, denies dizziness.     Objective      /70 (BP Location: Left arm, Patient Position: Sitting, Cuff Size: Standard)   Wt 73 kg (161 lb)   LMP 03/26/2024 (Approximate)   Breastfeeding Yes   BMI 29.44 kg/m²   Physical Exam  Constitutional:       General: She is not in acute distress.     Appearance: Normal appearance. She is not ill-appearing.   Pulmonary:      Effort: No respiratory distress.   Abdominal:      General: Abdomen is flat.      Palpations: Abdomen is soft.   Skin:     Capillary Refill: Capillary refill takes less than 2 seconds.   Neurological:      Mental Status: She is alert  and oriented to person, place, and time.   Psychiatric:         Mood and Affect: Mood normal.         Behavior: Behavior normal.         Thought Content: Thought content normal.         Judgment: Judgment normal.       Incision: Approximated, no swelling, redness, tenderness, drainage to site.       Assessment & Plan   Diagnoses and all orders for this visit:    Postpartum care following  delivery    Blurry vision, bilateral  -     CBC and differential; Future  -     Comprehensive metabolic panel; Future  -     Protein / creatinine ratio, urine; Future    Postpartum anxiety  -     Ambulatory referral to Psych Services; Future        Savannah Morrison is a 34 y.o. who is postpartum from a C/S with a normal incision check      1. Postpartum appointment scheduled 25  2. Will obtain CBC, CMP, P:C ratio to r/o pre-eclampsia. Strict precautions provided including blurry vision with headache, dizziness, RUQ abdominal pain to notify office. Any chest pain or shortness of breath to visit local emergency department.   3. Lactation consult, [facility] information discussed. Referral placed for talk therapy which patient can also seek treatment for at [facility].   4. If you are experiencing headache, blurry vision, dizziness, chest pain, or shortness of breath to please notify office.

## 2025-01-07 ENCOUNTER — OFFICE VISIT (OUTPATIENT)
Dept: OBGYN CLINIC | Facility: CLINIC | Age: 35
End: 2025-01-07

## 2025-01-07 VITALS — BODY MASS INDEX: 29.44 KG/M2 | WEIGHT: 161 LBS | SYSTOLIC BLOOD PRESSURE: 108 MMHG | DIASTOLIC BLOOD PRESSURE: 70 MMHG

## 2025-01-07 DIAGNOSIS — F41.8 POSTPARTUM ANXIETY: ICD-10-CM

## 2025-01-07 DIAGNOSIS — H53.8 BLURRY VISION, BILATERAL: ICD-10-CM

## 2025-01-07 PROCEDURE — 99024 POSTOP FOLLOW-UP VISIT: CPT

## 2025-01-17 ENCOUNTER — OFFICE VISIT (OUTPATIENT)
Dept: POSTPARTUM | Facility: CLINIC | Age: 35
End: 2025-01-17
Payer: COMMERCIAL

## 2025-01-17 PROCEDURE — 99404 PREV MED CNSL INDIV APPRX 60: CPT | Performed by: PEDIATRICS

## 2025-01-17 NOTE — PROGRESS NOTES
I have reviewed the notes, assessments, and/or procedures performed by Minerva August RN, IBCLC, I concur with her/his documentation of Savannah Smith MD 01/17/25

## 2025-01-17 NOTE — PATIENT INSTRUCTIONS
"-Great meeting with you and your sweet family today!   -Continue to offer Jean-Claude the breast on demand when feeling motivated to do so.   -Watch for signs throughout the feeding that he is effectively removing milk. You will feel strong tugging, hear swallowing and will feel your breasts get softer after nursing.   -No need to pump if baby is latching and feeding well at the breast on demand.   -Pump as needed for missed feedings at the breast or for uncomfortable engorgement, utilize flange fit and settings that are comfortable for you, pumping should not be painful!   -Cycle pumping : high speed, low suction until you see your milk flow, then switch to a lower speed and higher suction to express the milk efficiently. Continue to gradually increase suction and decrease speed throughout the session. You may cycle back into \"massage mode\" to stimulate another letdown when you see your milk flow slow within the pumping session or at the end of the pumping session.   -Try to be as relaxed as possible while pumping, take some deep breaths, feeling yourself relax from head to toe, watch something funny on your phone or TV, cover flanges, etc.    -Wear a supportive, but not tight, bra. Sit comfortably reclined when nursing or pumping, and throughout the day when possible.    -We discussed considering a standard electric pump for more consistently efficient pumping sessions. Please call if in your search for one of these pumps you have other questions or concerns.   - Storing and Thawing Breast Milk  [facility] Breastfeeding Support ([email or web address])    -Please call or scheduled a follow up appointment with lactation as needed for questions or concerns you may have.     "

## 2025-01-17 NOTE — PROGRESS NOTES
INITIAL BREAST FEEDING EVALUATION    Informant/Relationship: Savannah (mom/self) and Nelson (FOB)     Discussion of General Lactation Issues: Family planned to exclusively breastfeed.  His weight before leaving the hospital was 12%, then at the first Ped visit he was 15% loss and had some janudice and they started supplementing. Savannah's goal is to work towards not using formula and do more breastfeeding. He gets very sleepy when breastfeeding most of the time and some of the time when bottle feeding as well.     In the hospital Savannah and Jean-Claude worked with 2 IBCLCs to improve latch, this was helpful.     Infant is 3 weeks and 1 day old today.        History:  Fertility Problem:no  Breast changes:yes - nipple enlargement and sensitivity, breasts in general did not get larger.   :  baby was C/S for breech positioning   Full term:yes - 39 and 2    labor:no  First nursing/attempt < 1 hour after birth:yes - this was a good latch and immediate when he was given to Mom.   Skin to skin following delivery:yes - in PACU  Breast changes after delivery: unsure, the past 3 days she's felt more heaviness as she takes more time in between pumping session  Rooming in (infant in room with mother with exception of procedures, eg. 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    Migraine 2012    Psoriasis     Psoriatic arthritis (HCC)     Varicella          Current Outpatient Medications:     acetaminophen (TYLENOL) 325 mg tablet, Take 3 tablets (975 mg total) by mouth every 6 (six) hours as needed for mild pain for up to 3 doses, Disp: , Rfl:     clobetasol (TEMOVATE) 0.05 % external  solution, Apply topically 2 (two) times a day For scalp psoriasis, Disp: 50 mL, Rfl: 6    clobetasol (TEMOVATE) 0.05 % ointment, Apply topically 2 (two) times a day For body psoriasis, Disp: 60 g, Rfl: 5    docusate sodium (COLACE) 100 mg capsule, Take 1 capsule (100 mg total) by mouth 2 (two) times a day, Disp: 60 capsule, Rfl: 0    hydrocortisone 2.5 % ointment, Apply topically 2 (two) times a day For face psoriasis, Disp: 30 g, Rfl: 6    ibuprofen (MOTRIN) 600 mg tablet, Take 1 tablet (600 mg total) by mouth every 6 (six) hours, Disp: 30 tablet, Rfl: 0    Omega-3 Fatty Acids (OMEGA 3 PO), Take by mouth, Disp: , Rfl:     ondansetron (ZOFRAN) 4 mg tablet, Take 1 tablet (4 mg total) by mouth every 8 (eight) hours as needed for nausea or vomiting (Patient not taking: Reported on 2025), Disp: 30 tablet, Rfl: 1    oxyCODONE (Roxicodone) 5 immediate release tablet, Take 1 tablet (5 mg total) by mouth every 4 (four) hours as needed for severe pain for up to 10 doses Max Daily Amount: 30 mg (Patient not taking: Reported on 2025), Disp: 10 tablet, Rfl: 0    Prenatal Vit-Fe Fumarate-FA (Prenatal/Folic Acid) TABS, Take 1 tablet by mouth daily, Disp: 30 tablet, Rfl: 11    Pyridoxine HCl (vitamin B-6) 25 MG tablet, , Disp: , Rfl:     simethicone (MYLICON) 80 mg chewable tablet, Chew 1 tablet (80 mg total) 4 (four) times a day as needed for flatulence (Patient not taking: Reported on 2025), Disp: 30 tablet, Rfl: 0    Allergies   Allergen Reactions    Sulfa Antibiotics Hives       Social History     Substance and Sexual Activity   Drug Use Never       Social History     Interval Breastfeeding History:    Frequency of breast feedin x per day   Does mother feel breastfeeding is effective: If no, explain: not as much   Does infant appear satisfied after nursing:If no, explain: falling asleep, but still cues after for more   Stooling pattern normal: Yes  Urinating frequently:Yes  Using shield or shells:  "No    Alternative/Artificial Feedings:   Bottle: Yes, Ozzie Tao, family is trying to burp him after every ounce, not sure if they're doing pacing   Cup: No  Syringe/Finger: No           Formula Type: Similac 360 Total Care                      Amount: 2-3.5 ounces             Breast Milk:                      Amount: offering this with every other feeding, offer what is available             Frequency Q 2-3 h, about 6-7 bottles per day   Elimination Problems: No     He does have some spit ups.       Equipment:    Pump            Type: Andreiazcorinna hands free             Frequency of Use: every 2-4 hours     She is collecting about 1.5-2.5 ounces per pump     Equipment Problems: no    Mom:  Breast: asymmetrical, both have full feeling glandular tissue throughout. Left breast is tubular shaped, right breast is rounded. Slight wide spacing.   Nipple Assessment in General: Normal: elongated/eraser, no discoloration and no damage noted.  Mother's Awareness of Feeding Cues                 Recognizes: Yes                  Verbalizes: Yes   Support System: good support   History of Breastfeeding: first time breastfeeding   Changes/Stressors/Violence: Left side baby often pulls off and \"head bangs\", More comfortably latching on the right. He does not seem satisfied when directly breastfeeding. Mom's pumping output is less than Cassian's demands.   Concerns/Goals: Savannah desires to exclusively breastfeed.     Problems with Mom: low production     Physical Exam  Constitutional:       Appearance: Normal appearance.   HENT:      Head: Normocephalic.   Pulmonary:      Effort: Pulmonary effort is normal.   Musculoskeletal:         General: Normal range of motion.      Cervical back: Normal range of motion.   Neurological:      General: No focal deficit present.      Mental Status: She is alert and oriented to person, place, and time.   Skin:     General: Skin is warm.      Capillary Refill: Capillary refill takes less than 2 seconds. "   Psychiatric:         Mood and Affect: Mood normal.         Behavior: Behavior normal.         Thought Content: Thought content normal.         Judgment: Judgment normal.       Infant:  Behaviors: Alert  Color: Pink  Birth weight: 3415 g   Current weight: 3820 g     Problems with infant: chomping motion when sucking, poor suction       General Appearance:  Alert, active, no distress                             Head:  Normocephalic, AFOF, sutures opposed                             Eyes:  Conjunctiva clear, no drainage                              Ears:  Normally placed, no anomolies                             Nose:  Septum intact, no drainage or erythema                           Mouth:  No lesions. Tongue is flat when crying, observed tongue extend past lip and good lateralization of body and tip of tongue. He cups my finger well, but loses cup easily when gentle traction is placed on his mandible. Snap back noted. Jaw and tongue quivering noted on oral exam and when observing a breastfeeding session. Manual lift shows rounded tongue tip, frenulum connected on the floor of the mouth and well behind the tongue tip, minor speed bump noted                     Neck:  Supple, symmetrical, trachea midline                 Respiratory:  No grunting, flaring, retractions, breath sounds clear and equal            Cardiovascular:  Regular rate and rhythm. No murmur. Adequate perfusion/capillary refill. Femoral pulse present                    Abdomen:   Soft, non-tender, no masses, bowel sounds present, no HSM             Genitourinary:  Normal male, testes descended, no discharge, swelling, or pain, anus patent                          Spine:   No abnormalities noted        Musculoskeletal:  Full range of motion          Skin/Hair/Nails:   Skin warm, dry, and intact, no rashes or abnormal dyspigmentation or lesions                Neurologic:   No abnormal movement, tone appropriate for gestational age      Latch:  Efficiency:               Lips Flanged: Yes              Depth of latch: wide              Audible Swallow: Yes              Visible Milk: Yes              Wide Open/ Asymmetrical: Yes              Suck Swallow Cycle: Breathing: yes - tires easily , Coordinated: chomping  Nipple Assessment after latch: Normal: elongated/eraser, no discoloration and no damage noted.  Latch Problems: He does attach well - draws in the nipple and has a wide attachment, Mom denies pain. He fatigues quickly and needs frequent reminders to continue sucking.     Position:  Infant's Ergonomics/Body               Body Alignment: Yes               Head Supported: Yes               Close to Mom's body/ Lifted/ Supported: Yes               Mom's Ergonomics/Body: Yes                           Supported: Yes                           Sitting Back: Yes                           Brings Baby to her breast: Yes  Positioning Problems: None. Reviewed BN hold and Mom returns demonstration well. He appears comfortable on both the left and right side.       Handouts:   Paced bottle feeding, Latch Check List, and Breast Compressions     Education:  Reviewed Latch: importance of deep latch without pain.   Reviewed Positioning for Dyad: proper alignment and head angle when positioning at the breast   Reviewed Frequency/Supply & Demand: offer the breast at each feeding, pump if baby is not latching and effective transferring milk.   Reviewed Infant:Cues and varied States of Awareness: watch for hunger cues, feed on demand. If baby seems satisfied at the breast (calm, relaxed sleeping, breasts are softer) no need to pump or supplement   Reviewed Infant Elimination: goal of 6+ wets and 2-3 stools per day   Reviewed Alternative/Artificial Feedings: paced bottle feeding technique demonstrated  Reviewed Mom/Breast care: gentle handling of the breast at all times, discussed lymphatic drainage and reverse pressure softening, as well as tips for healing sore  nipples.    Reviewed Equipment: Hand pump and electric pump general guidance, Discussed proper flange fit, how to measure        Plan:     Reassurance provided that baby is growing well at this time. Cont with positioning adjustments and watch for signs of effective feeding on the breast. Pump to replace feeding at the breast with bottle feeding or for sleepy feedings. If pumping, plan for every 2-3 hours during the day and 3-4 hours at night. One 5 hour stretch of sleep is acceptable. Gentle handling of the breast at all times to preserve integrity.  Contact [facility] for breastfeeding support as needed or ongoing concerns with latching comfort and milk transfer.     I have spent 90 minutes with Patient and family today in which greater than 50% of this time was spent in counseling/coordination of care regarding Patient and family education.

## 2025-01-22 ENCOUNTER — TELEPHONE (OUTPATIENT)
Age: 35
End: 2025-01-22

## 2025-01-22 NOTE — TELEPHONE ENCOUNTER
Contacted patient in regards to Routine Referral in attempts to verify patient's needs of services and add patient to proper wait list. Writer left vm to call intake at [phone]. Writer sent referral to [facility] for further assistance.       Attempt #2  Referral closed

## 2025-01-24 ENCOUNTER — APPOINTMENT (OUTPATIENT)
Dept: LAB | Facility: CLINIC | Age: 35
End: 2025-01-24
Payer: COMMERCIAL

## 2025-01-24 DIAGNOSIS — H53.8 BLURRY VISION, BILATERAL: ICD-10-CM

## 2025-01-24 LAB
ALBUMIN SERPL BCG-MCNC: 4.1 G/DL (ref 3.5–5)
ALP SERPL-CCNC: 93 U/L (ref 34–104)
ALT SERPL W P-5'-P-CCNC: 18 U/L (ref 7–52)
ANION GAP SERPL CALCULATED.3IONS-SCNC: 6 MMOL/L (ref 4–13)
AST SERPL W P-5'-P-CCNC: 16 U/L (ref 13–39)
BASOPHILS # BLD AUTO: 0.05 THOUSANDS/ΜL (ref 0–0.1)
BASOPHILS NFR BLD AUTO: 1 % (ref 0–1)
BILIRUB SERPL-MCNC: 0.67 MG/DL (ref 0.2–1)
BUN SERPL-MCNC: 15 MG/DL (ref 5–25)
CALCIUM SERPL-MCNC: 9.3 MG/DL (ref 8.4–10.2)
CHLORIDE SERPL-SCNC: 102 MMOL/L (ref 96–108)
CO2 SERPL-SCNC: 33 MMOL/L (ref 21–32)
CREAT SERPL-MCNC: 0.82 MG/DL (ref 0.6–1.3)
CREAT UR-MCNC: 25.6 MG/DL
EOSINOPHIL # BLD AUTO: 0.16 THOUSAND/ΜL (ref 0–0.61)
EOSINOPHIL NFR BLD AUTO: 3 % (ref 0–6)
ERYTHROCYTE [DISTWIDTH] IN BLOOD BY AUTOMATED COUNT: 13 % (ref 11.6–15.1)
GFR SERPL CREATININE-BSD FRML MDRD: 94 ML/MIN/1.73SQ M
GLUCOSE P FAST SERPL-MCNC: 68 MG/DL (ref 65–99)
HCT VFR BLD AUTO: 42.5 % (ref 34.8–46.1)
HGB BLD-MCNC: 13.1 G/DL (ref 11.5–15.4)
IMM GRANULOCYTES # BLD AUTO: 0.01 THOUSAND/UL (ref 0–0.2)
IMM GRANULOCYTES NFR BLD AUTO: 0 % (ref 0–2)
LYMPHOCYTES # BLD AUTO: 1.81 THOUSANDS/ΜL (ref 0.6–4.47)
LYMPHOCYTES NFR BLD AUTO: 31 % (ref 14–44)
MCH RBC QN AUTO: 29.9 PG (ref 26.8–34.3)
MCHC RBC AUTO-ENTMCNC: 30.8 G/DL (ref 31.4–37.4)
MCV RBC AUTO: 97 FL (ref 82–98)
MONOCYTES # BLD AUTO: 0.43 THOUSAND/ΜL (ref 0.17–1.22)
MONOCYTES NFR BLD AUTO: 8 % (ref 4–12)
NEUTROPHILS # BLD AUTO: 3.3 THOUSANDS/ΜL (ref 1.85–7.62)
NEUTS SEG NFR BLD AUTO: 57 % (ref 43–75)
NRBC BLD AUTO-RTO: 0 /100 WBCS
PLATELET # BLD AUTO: 295 THOUSANDS/UL (ref 149–390)
PMV BLD AUTO: 9.7 FL (ref 8.9–12.7)
POTASSIUM SERPL-SCNC: 4.1 MMOL/L (ref 3.5–5.3)
PROT SERPL-MCNC: 7 G/DL (ref 6.4–8.4)
PROT UR-MCNC: <4 MG/DL
RBC # BLD AUTO: 4.38 MILLION/UL (ref 3.81–5.12)
SODIUM SERPL-SCNC: 141 MMOL/L (ref 135–147)
WBC # BLD AUTO: 5.76 THOUSAND/UL (ref 4.31–10.16)

## 2025-01-24 PROCEDURE — 85025 COMPLETE CBC W/AUTO DIFF WBC: CPT

## 2025-01-24 PROCEDURE — 84156 ASSAY OF PROTEIN URINE: CPT

## 2025-01-24 PROCEDURE — 82570 ASSAY OF URINE CREATININE: CPT

## 2025-01-24 PROCEDURE — 36415 COLL VENOUS BLD VENIPUNCTURE: CPT

## 2025-01-24 PROCEDURE — 80053 COMPREHEN METABOLIC PANEL: CPT

## 2025-02-10 ENCOUNTER — OFFICE VISIT (OUTPATIENT)
Age: 35
End: 2025-02-10
Payer: COMMERCIAL

## 2025-02-10 VITALS — DIASTOLIC BLOOD PRESSURE: 76 MMHG | SYSTOLIC BLOOD PRESSURE: 112 MMHG

## 2025-02-10 DIAGNOSIS — O92.4 HYPOGALACTIA: Primary | ICD-10-CM

## 2025-02-10 DIAGNOSIS — N64.82 BREAST HYPOPLASIA: ICD-10-CM

## 2025-02-10 DIAGNOSIS — L30.9 DERMATITIS OF NIPPLE: ICD-10-CM

## 2025-02-10 PROCEDURE — 99401 PREV MED CNSL INDIV APPRX 15: CPT | Performed by: PEDIATRICS

## 2025-02-10 PROCEDURE — 99215 OFFICE O/P EST HI 40 MIN: CPT | Performed by: PEDIATRICS

## 2025-02-10 RX ORDER — TRIAMCINOLONE ACETONIDE 1 MG/G
CREAM TOPICAL 3 TIMES DAILY
Qty: 15 G | Refills: 1 | Status: SHIPPED | OUTPATIENT
Start: 2025-02-10

## 2025-02-10 RX ORDER — METFORMIN HYDROCHLORIDE 500 MG/1
500 TABLET, EXTENDED RELEASE ORAL
Qty: 30 TABLET | Refills: 0 | Status: SHIPPED | OUTPATIENT
Start: 2025-02-10 | End: 2025-03-12

## 2025-02-10 NOTE — PROGRESS NOTES
"BREAST FEEDING FOLLOW UP VISIT    Informant/Relationship: Savannah and Nelson/mom and dad    Discussion of General Lactation Issues: At Jean-Claude's first pediatrician's appointment he was noted to have lost weight and be jaundice. Supplement was recommended. Savannah has been pumping, but has seen a decrease in what she is collecting.    At this time, Savannah feed Jean-Claude directly at the breast once in the morning and once at night. After the morning feeding he seems satisfied for about 2 hours. At night he will sleep for about 4 hours.    Attachment at the breast is typically 0/10, but occasionally to a 2/10. This has been more recently since trying a new pump flange insert.    Jean-Claude spills from the bottle and his jaw often quivers while he takes milk from the bottle. He has started to choke a little while taking the bottle. He makes some grunting noises. He does make some occasional clicking sounds, but not \"kissing\" sounds at the bottle. He does the chin quivering at the breast, but has none of the other issues.     Infant is 46 days old today.    Interval Breastfeeding History:    Frequency of breast feeding: twice daily  Does mother feel breastfeeding is effective: Yes, when he is at the breast  Does infant appear satisfied after nursing:Yes  Stooling pattern normal:Yes  Urinating frequently:Yes  Using shield or shells:No    Alternative/Artificial Feedings:   Bottle: Yes, using Evenflo with slowest flow nipple and paced bottle feeding  Cup: No  Syringe/Finger: No           Formula Type: Similac Pro Total Care 360                     Amount: 2-4 oz            Breast Milk:                      Amount: 2-4 oz            Frequency Q 3 Hr between feedings during the day; up to 4 hours at night  Elimination Problems: No      Equipment:  Nipple Shield             Type: n/a             Size: n/a             Frequency of Use: n/a  Pump            Type: Spectra S1 (typically use) and MomCozy            Frequency of Use: every 3 " hours during the day and every 4 hours at night; collects 1.5-2 oz with each pump  Shells            Type: n/a            Frequency of use: n/a    Equipment Problems: yes nipples pull fully into the flange (using smallest silicone pumpin' pals); suction is 5      Mom:  Breast: Asymmetrical; right breast is rounder and slightly larger than the left; both breasts are rounder laterally; the left breast is somewhat conical in shape; areola are appropriate size for the breasts; there is erythema and dryness of the 1/2 of the areola; breasts are widely spaced  Nipple Assessment in General: Normal: elongated/eraser, and no damage noted. 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 She is well-developed and normal weight.   HENT:      Head: Normocephalic and atraumatic.   Eyes:      Extraocular Movements: Extraocular movements intact.   Neck:      Thyroid: No thyromegaly.   Cardiovascular:      Rate and Rhythm: Normal rate and regular rhythm.      Pulses: Normal pulses.      Heart sounds: Normal heart sounds. No murmur heard.  Pulmonary:      Effort: Pulmonary effort is normal.      Breath sounds: Normal breath sounds.   Musculoskeletal:      Cervical back: Normal range of motion and neck supple.   Lymphadenopathy:      Cervical: No cervical adenopathy.      Upper Body:      Right upper body: No pectoral adenopathy.      Left upper body: No pectoral adenopathy.   Neurological:      General: No focal deficit present.      Mental Status: She is alert and  oriented to person, place, and time.   Psychiatric:         Mood and Affect: Mood normal.         Behavior: Behavior normal.         Thought Content: Thought content normal.         Judgment: Judgment normal.   Vitals and nursing note reviewed.         Infant:  Behaviors: Sleepy easily aroused  Color: Healthy  Birth weight: 3.415 kg  Current weight: 4.475 kg    Problems with infant: Restricted tongue movement      General Appearance:  Alert, active, no distress                             Head:  Normocephalic, AFOF, sutures opposed                             Eyes:  Conjunctiva clear, no drainage                              Ears:  Normally placed, no anomolies                            Nose:  Septum intact, no drainage or erythema                           Mouth:  No lesions; tongue forms a slight bowl with crying; extends to but not over his lower lip; has little lateralization bilaterally; there is some cupping of the examiner's finger but the seal is easily broken; there is little peristalsis appreciated; lips purse when sucking on examiner' finger but gaps are still noted at the corners; lips have slight chapping noted on both upper and lower; passive lift of the tongue shows a broad, inelastic band that limits passive lift and narrows the oral gape attaching to the mid tongue and mid mandibular ridge                    Neck:  Supple, symmetrical, trachea midline, no adenopathy; thyroid: no enlargement, symmetric, no tenderness/mass/nodules                 Respiratory:  No grunting, flaring, retractions, breath sounds clear and equal            Cardiovascular:  Regular rate and rhythm. No murmur. Adequate perfusion/capillary refill. Femoral pulse present                    Abdomen:   Soft, non-tender, no masses, bowel sounds present, no HSM             Genitourinary:  Normal male, testes descended, no discharge, swelling, or pain, anus patent                          Spine:   No abnormalities noted         Musculoskeletal:  Full range of motion          Skin/Hair/Nails:   Skin warm, dry, and intact, no rashes or abnormal dyspigmentation or lesions                Neurologic:   No abnormal movement, tone appropriate for gestational age     Latch:  Efficiency:               Lips Flanged: Yes, after frenotomy              Depth of latch: Excellent, after frenotomy              Audible Swallow: Yes, after frenotomy              Visible Milk: Yes, after frenotomy              Wide Open/ Asymmetrical: Yes, after frenotomy              Suck Swallow Cycle: Breathing: Unlabored, Coordinated: Yes  Nipple Assessment after latch: Normal: elongated/eraser, no discoloration and no damage noted.  Latch Problems: After the frenotomy, Savannah easily assists Jean-Claude to a wide, deep, asymmetrical, and comfortable attachment where he quickly attaches and attains intermittent bursts of SSB. He is sleepy but responds well to breast compressions and can be heard transferring milk well. He breastfeeds at both breasts until content.     Position:  Infant's Ergonomics/Body               Body Alignment: Yes               Head Supported: Yes               Close to Mom's body/ Lifted/ Supported: Yes               Mom's Ergonomics/Body: Yes                           Supported: Yes                           Sitting Back: Yes                           Brings Baby to her breast: Yes  Positioning Problems: None        Education:  Reviewed Latch: Reviewed how to gently compress the breast as if offering a sandwich to facilitate a deeper latch.    Reviewed Positioning for Dyad: Reviewed how to bring baby to the breast so that his lower lip and chin touch the breast with his nose just above the nipple to encourage a wider, more asymmetric latch.   Reviewed Frequency/Supply & Demand: Recommended feeding on demand: when the baby gives hunger cues, when the breasts feel full, every 3 hours during the day and every 5 hours at night counting from the  beginning of one feeding to the beginning of the next; whichever comes first.    Reviewed Alternative/Artificial Feedings: Paced bottle feeding  Reviewed Mom/Breast care: Reviewed supplements that can help build milk production by stimulating prolactin, those that can help increase breast tissue and milk production; reviewed the use of steroid cream to manage dermatitis.   Reviewed Equipment: Reviewed flange fit and pump settings for best comfort and effectiveness of pumping; discussed the benefits of choosing one pump over another.       Plan:  Discussed history and physical exams with Savannah. Support given for her commitment to providing breast milk for her baby. Discussed the findings on the baby's exam consistent with tongue tie and reviewed how this may be the cause of nipple trauma, nipple pain, nipple damage, poor milk transfer, blocked ducts, mastitis, and loss of milk production. Discussed the science that supports performing the frenotomy to improve latch.   Discussed the findings in Savannah's history and on her exam that are consistent with breast hypoplasia and hypogalactia. Recommended continued breastfeeding and milk expression frequently to build milk production based on demand. Reviewed the use of supplements that can help build milk production both by increasing prolactin and by building breast tissue. Recommended torbangun and metformin er. Follow up with any concerns or wishes to increase the metformin dose as needed.   Discussed the findings of dermatitis on Savannah's nipples.  This developed around the same time that Savannah began to apply lanolin prior to pumping. Recommended stopping the lanolin and using triamcinolone cream for the dermatitis. Discussed Savannah's history of psoriasis and monitor for improvement.    Reviewed the fit of pump flanges and the use of the pump settings for the most comfortable and effective milk expression. Reviewed the recommendations regarding wearable vs hands-on pumps.  Recommended Savannah use the pump that she feels works best for her.     I have spent 60 minutes with Patient and family today in which greater than 50% of this time was spent in counseling/coordination of care regarding Prognosis, Risks and benefits of tx options, Instructions for management, Patient and family education, Importance of tx compliance, Risk factor reductions, Impressions, Counseling / Coordination of care, Documenting in the medical record, Reviewing / ordering tests, medicine, procedures  , and Obtaining or reviewing history  .

## 2025-02-10 NOTE — PATIENT INSTRUCTIONS
Start metformin er 500 mg with dinner. Let Minerva know next week if you'd like to consider increasing to 2 pills.    May try Torbangun, marketed as Boob Food Too from [employer] at [email or web address]. The dosing is on the bottle. Use the Boob Food Too, not Boob Food, for better results and fewer side effects. This product is also available through [employer].     Apply the triamcinolone cream sparingly to the rash on your nipples 3-4 times/day after feeding or pumping. You may gently wipe it off with a warm wet cotton ball or swab before nursing or pumping if you feel it is needed.    Your flange should fit so that your nipple moves freely in the tunnel with little to no areola joining it. Use the lowest effective suction. You may find that a larger flange and/or a lower suction helps.    Feel free to retry the MomCozy and see if you obtain more milk with this. Some people find this to work better for them.

## 2025-02-19 ENCOUNTER — OFFICE VISIT (OUTPATIENT)
Dept: POSTPARTUM | Facility: CLINIC | Age: 35
End: 2025-02-19
Payer: COMMERCIAL

## 2025-02-19 PROCEDURE — 99404 PREV MED CNSL INDIV APPRX 60: CPT | Performed by: PEDIATRICS

## 2025-02-19 NOTE — PROGRESS NOTES
INITIAL BREAST FEEDING EVALUATION    Informant/Relationship: Savannah (mom/self) and Nelson (FOB)     Discussion of General Lactation Issues: Baby Jean-Claude had a frenotomy on 2/10. Since then baby seems to be latching better and production is up when pumping. Sometimes will get a bottle afterwards but not always.     PT consult is in, family needs to call back to schedule. Speech appt is set for next week.     Infant is 55 days old today.         Social History     Interval Breastfeeding History:    Frequency of breast feedin-3 x per day, first and last feeding on the day and one in between   Does mother feel breastfeeding is effective: Yes  Does infant appear satisfied after nursing:If no, explain: only sometimes   Stooling pattern normal: Yes  Urinating frequently:Yes  Using shield or shells: No    Alternative/Artificial Feedings:   Bottle: Yes, using Evenflo with slowest flow nipple, pacing feedings  Cup: No  Syringe/Finger: No           Formula Type: Simliac pro total care 360                      Amount: 2-4 oz            Breast Milk:                      Amount: 2-4 oz            Frequency Q 5-6 bottles per day, feeding every 2-3 hours day and night   Elimination Problems: No      Equipment:    Pump            Type: Spectra S1             Frequency of Use: every 3 hours, going up to four hours overnight . Not pumping if Jean-Claude is nursing for that feeding     Equipment Problems: yes noticing nipple/breast tunneling.     Mom:  Breast: Normal, asymmetrical   Nipple Assessment in General: Normal: elongated/eraser, no discoloration and no damage noted.  Mother's Awareness of Feeding Cues                 Recognizes: Yes                  Verbalizes: Yes  Support System: FOB  History of Breastfeeding: first time breastfeeding   Changes/Stressors/Violence: improvements seen since frenotomy, mom's production is below what Jean-Claude needs, but improving   Concerns/Goals: Savannah desires to provide breast milk for Jean-Claude as  long as possible.     Problems with Mom: low production     Physical Exam  Constitutional:       Appearance: Normal appearance.   HENT:      Head: Normocephalic.   Pulmonary:      Effort: Pulmonary effort is normal.   Musculoskeletal:         General: Normal range of motion.      Cervical back: Normal range of motion.   Neurological:      General: No focal deficit present.      Mental Status: She is alert and oriented to person, place, and time.   Skin:     General: Skin is warm.      Capillary Refill: Capillary refill takes less than 2 seconds.   Psychiatric:         Mood and Affect: Mood normal.         Behavior: Behavior normal.         Thought Content: Thought content normal.         Judgment: Judgment normal.       Infant:  Behaviors: Alert  Color: Pink  Birth weight: 3415 g   Current weight: 4690 g     Problems with infant: tension, s/p frenotomy       General Appearance:  Alert, active, no distress                             Head:  Normocephalic, AFOF, sutures opposed                             Eyes:  Conjunctiva clear, no drainage                              Ears:  Normally placed, no anomolies                             Nose:  Septum intact, no drainage or erythema                           Mouth:  No lesions. Tongue is mid mouth and bowl shaped when crying. Extends to lip. Not willing to suck on my finger today, becomes very fussy during the exam. Under tongue healing frenotomy wound is observed. On the breast he appears to have good jaw motion.                     Neck:  Supple, symmetrical, trachea midline, no adenopathy; thyroid: no enlargement, symmetric, no tenderness/mass/nodules                 Respiratory:  No grunting, flaring, retractions, breath sounds clear and equal            Cardiovascular:  Regular rate and rhythm. No murmur. Adequate perfusion/capillary refill. Femoral pulse present                    Abdomen:   Soft, non-tender, no masses, bowel sounds present, no HSM              Genitourinary:  Normal male, testes descended, no discharge, swelling, or pain, anus patent                          Spine:   No abnormalities noted        Musculoskeletal:  Full range of motion          Skin/Hair/Nails:   Skin warm, dry, and intact, no rashes or abnormal dyspigmentation or lesions                Neurologic:   No abnormal movement, tone appropriate for gestational age    Campus Latch:  Efficiency:               Lips Flanged: Yes              Depth of latch: wide               Audible Swallow: Yes, every 3-5 sucks               Visible Milk: Yes              Wide Open/ Asymmetrical: Yes              Suck Swallow Cycle: Breathing: yes, Coordinated: yes  Nipple Assessment after latch: Normal: elongated/eraser, no discoloration and no damage noted.  Latch Problems: Wide attachment observed today, he is active on the breast with intermittent swallows. Responds well to gentle breast compression when latched.     Position:  Infant's Ergonomics/Body               Body Alignment: Yes               Head Supported: Yes               Close to Mom's body/ Lifted/ Supported: Yes               Mom's Ergonomics/Body: Yes                           Supported: Yes                           Sitting Back: Yes                           Brings Baby to her breast: Yes  Positioning Problems: None       Education:  Reviewed Latch: importance of deep latch without pain.   Reviewed Positioning for Dyad: proper alignment and head angle when positioning at the breast   Reviewed Frequency/Supply & Demand: offer the breast at each feeding, pump if baby is not latching and effective transferring milk.   Reviewed Infant:Cues and varied States of Awareness: watch for hunger cues, feed on demand. If baby seems satisfied at the breast (calm, relaxed sleeping, breasts are softer) no need to pump or supplement   Reviewed Infant Elimination: goal of 6+ wets and 2-3 stools per day   Reviewed Alternative/Artificial Feedings: paced bottle  feeding technique demonstrated  Reviewed Mom/Breast care: gentle handling of the breast at all times    Reviewed Equipment: Hand pump and electric pump general guidance, Discussed proper flange fit, how to measure        Plan:     Reassurance provided that baby is growing well at this time and tongue appears to be healing well. Cont with good positioning technique and watch for signs of effective feeding. Pump if wanting to replace feeding at the breast with bottle feeding or if latching becomes painful. Gentle handling of the breast at all times to preserve integrity.  Contact [facility] for breastfeeding support as needed or ongoing concerns with latching comfort and milk transfer.     I have spent 60 minutes with Patient and family today in which greater than 50% of this time was spent in counseling/coordination of care regarding Patient and family education.

## 2025-02-19 NOTE — PATIENT INSTRUCTIONS
-Great seeing you and your family again today!   -Offer Cassian the breast when feeling motivated to do so. Watch for signs throughout the feeding that baby is effectively removing milk. You will feel strong tugging, hear swallowing and will feel your breasts get softer after nursing.   -No need to pump if baby is latching and feeding well at the breast on demand.   -Pump only as needed for missed feedings at the breast or for uncomfortable engorgement, utilize flange fit and settings that are comfortable for you, pumping should not be painful!   -Continue with lowest effective suction and use the smaller flange if comfortable.   - Storing and Thawing Breast Milk  [facility] Breastfeeding Support ([place]da.gov)    -Please call or scheduled a follow up appointment with lactation as needed for questions or concerns you may have.

## 2025-02-21 NOTE — PROGRESS NOTES
I have reviewed the notes, assessments, and/or procedures performed by Minerva August RN, IBCLC, I concur with her/his documentation of Savannah Smith MD 02/21/25

## 2025-03-10 DIAGNOSIS — O92.4 HYPOGALACTIA: ICD-10-CM

## 2025-03-10 DIAGNOSIS — N64.82 BREAST HYPOPLASIA: ICD-10-CM

## 2025-03-10 RX ORDER — METFORMIN HYDROCHLORIDE 500 MG/1
500 TABLET, EXTENDED RELEASE ORAL
Qty: 30 TABLET | Refills: 0 | Status: SHIPPED | OUTPATIENT
Start: 2025-03-10

## 2025-03-10 NOTE — PROGRESS NOTES
Assessment & Plan   Diagnoses and all orders for this visit:    Well woman exam  -     Ambulatory Referral to Family Practice; Future        ASSESSMENT & PLAN: Savannah is a 34 y.o.  with normal gynecologic exam.    1.  Routine well woman exam done today.  2.  Cervical Cancer Screening: Pap with cotesting HPV was not done today.  Current ASCCP Guidelines reviewed.   3.  Savannah declined STD testing in a mutually monogamous relationship.   4. Mammograms will begin at age 40 per ACOG guidelines   5.  Gardasil is recommended for patients from 9-45 years of age. Savannah has had the Gardasil vaccine series.   6. She declined contraception. Will use condoms for pregnancy prevention.   7. I have discussed the importance of monthly self-breast exams, exercise a minimum of 150 minutes a week including weight bearing exercises while maintaining a healthy diet including adequate supplementation of Calcium and Vitamin D.   8. Return to office in 12 months for annual exam.   9. Call your insurance company to verify coverage prior to completing any ordered tests.     All questions answered to the best of my ability.      Subjective     HPI   Savannah Morrison is a 34 y.o. female who presents for annual well woman exam.     GYN:  Menses are absent sinec C/S on 2024  denies vaginal discharge, labial erythema or lesions.   Denies vaginal itching, irritation, or dryness.   Contraception: condoms.  Patient is sexually active with male partner x 13 years. denies issus with intercourse. She declines STI testing.   (+) gynecologic surgeries -C/S 25  EPDS - 8    OB:  OB History    Para Term  AB Living   1 1 1 0 0 1   SAB IAB Ectopic Multiple Live Births   0 0 0 0 1      # Outcome Date GA Lbr Ismael/2nd Weight Sex Type Anes PTL Lv   1 Term 24 39w2d  3415 g (7 lb 8.5 oz) M CS-LTranv Spinal N TOM         :  denies dysuria, urinary frequency or urgency.  denies incontinence.    Breast:  denies breast mass, skin  changes, dimpling, reddening, nipple retraction.  denies breast discharge.  denies breast tenderness bilaterally  Is currently breastfeeding.     Patient does have a family history of breast, endometrial, colon, or ovarian ca. Maternal grandmother has history of breast cancer diagnosed at age 77.      Health Maintenance:    Yoga and pilates   She feels safe at home and denies domestic violence    She does follow a well balanced diet.    She does not use tobacco and denies alcohol  She does follow with a PCP.    Social History:  Social History     Socioeconomic History    Marital status: /Civil Union     Spouse name: Not on file    Number of children: Not on file    Years of education: Not on file    Highest education level: Not on file   Occupational History    Not on file   Tobacco Use    Smoking status: Never    Smokeless tobacco: Never   Vaping Use    Vaping status: Never Used   Substance and Sexual Activity    Alcohol use: Not Currently     Comment: Not weekly. Occasional. 2 times a month    Drug use: Never    Sexual activity: Not Currently     Partners: Male     Birth control/protection: Condom Male   Other Topics Concern    Not on file   Social History Narrative    Not on file     Social Drivers of Health     Financial Resource Strain: Not on file   Food Insecurity: No Food Insecurity (12/27/2024)    Nursing - Inadequate Food Risk Classification     Worried About Running Out of Food in the Last Year: Never true     Ran Out of Food in the Last Year: Never true     Ran Out of Food in the Last Year: Never true   Transportation Needs: No Transportation Needs (12/27/2024)    Nursing - Transportation Risk Classification     Lack of Transportation: Not on file     Lack of Transportation: No   Physical Activity: Not on file   Stress: Not on file   Social Connections: Not on file   Intimate Partner Violence: Unknown (12/27/2024)    Nursing IPS     Feels Physically and Emotionally Safe: Not on file     Physically  Hurt by Someone: Not on file     Humiliated or Emotionally Abused by Someone: Not on file     Physically Hurt by Someone: No     Hurt or Threatened by Someone: No   Housing Stability: Unknown (2024)    Nursing: Inadequate Housing Risk Classification     Has Housing: Not on file     Worried About Losing Housing: Not on file     Unable to Get Utilities: Not on file     Unable to Pay for Housing in the Last Year: No     Has Housin       Social History     Tobacco Use    Smoking status: Never    Smokeless tobacco: Never   Vaping Use    Vaping status: Never Used   Substance Use Topics    Alcohol use: Not Currently     Comment: Not weekly. Occasional. 2 times a month    Drug use: Never       Screening:  Cervical cancer: last pap smear in 2023. Results were NILM, -HRHPV. Patient has  received Gardasil vaccine.   History of abnormal pap smears: denies  Breast cancer: Will begin at age 40 per ACOG guidelines.   Colon cancer: Will begin at age 45.   STD screening: declined .    Review of Systems   Constitutional: Negative.  Negative for activity change, appetite change and fever.   Respiratory:  Negative for shortness of breath.    Cardiovascular:  Negative for chest pain.   Gastrointestinal:  Negative for abdominal pain, constipation, diarrhea and vomiting.   Genitourinary:  Negative for dyspareunia, dysuria, frequency, menstrual problem, pelvic pain, vaginal bleeding, vaginal discharge and vaginal pain.   Skin:  Negative for color change.   Psychiatric/Behavioral: Negative.     All other systems reviewed and are negative.      The following portions of the patient's history were reviewed and updated as appropriate: allergies, current medications, past family history, past medical history, past social history, past surgical history, and problem list.         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    Migraine 2012    Psoriasis     Psoriatic arthritis (HCC)     Varicella        Past Surgical History:   Procedure Laterality Date    KS  DELIVERY ONLY N/A 2024    Procedure:  SECTION ();  Surgeon: Lamar Urrutia MD;  Location: [facility] ;  Service: Obstetrics    TONSILECTOMY AND ADNOIDECTOMY      age 9       Family History   Problem Relation Age of Onset    Thyroid disease Mother     Depression Mother     Anxiety disorder Mother     Rashes / Skin problems Father         Psoriasis    Alcohol abuse Father         Passed away     Drug abuse Father     Ovarian cysts Sister     Breast cancer Maternal Grandmother     Hypertension Maternal Grandmother     Osteoporosis Maternal Grandmother     Hearing loss Maternal Grandmother     Cancer Maternal Grandfather     Heart attack Maternal Grandfather     Osteoarthritis Maternal Grandfather     Esophageal cancer Maternal Grandfather     Rheum arthritis Maternal Grandfather     Seizures Paternal Grandmother     Alcohol abuse Paternal Grandfather     Dementia Paternal Grandfather     Alcohol abuse Sister     Anxiety disorder Sister     Self-Injury Sister     Suicide Attempts Sister          Current Outpatient Medications:     clobetasol (TEMOVATE) 0.05 % external solution, Apply topically 2 (two) times a day For scalp psoriasis, Disp: 50 mL, Rfl: 6    clobetasol (TEMOVATE) 0.05 % ointment, Apply topically 2 (two) times a day For body psoriasis, Disp: 60 g, Rfl: 5    hydrocortisone 2.5 % ointment, Apply topically 2 (two) times a day For face psoriasis, Disp: 30 g, Rfl: 6    metFORMIN (GLUCOPHAGE-XR) 500 mg 24 hr tablet, TAKE 1 TABLET BY MOUTH EVERY DAY WITH DINNER, Disp: 30 tablet, Rfl: 0    Omega-3 Fatty Acids (OMEGA 3 PO), Take by mouth, Disp: , Rfl:      acetaminophen (TYLENOL) 325 mg tablet, Take 3 tablets (975 mg total) by mouth every 6 (six) hours as needed for mild pain for up to 3 doses, Disp: , Rfl:     docusate sodium (COLACE) 100 mg capsule, Take 1 capsule (100 mg total) by mouth 2 (two) times a day, Disp: 60 capsule, Rfl: 0    ibuprofen (MOTRIN) 600 mg tablet, Take 1 tablet (600 mg total) by mouth every 6 (six) hours, Disp: 30 tablet, Rfl: 0    ondansetron (ZOFRAN) 4 mg tablet, Take 1 tablet (4 mg total) by mouth every 8 (eight) hours as needed for nausea or vomiting (Patient not taking: Reported on 1/7/2025), Disp: 30 tablet, Rfl: 1    oxyCODONE (Roxicodone) 5 immediate release tablet, Take 1 tablet (5 mg total) by mouth every 4 (four) hours as needed for severe pain for up to 10 doses Max Daily Amount: 30 mg (Patient not taking: Reported on 1/7/2025), Disp: 10 tablet, Rfl: 0    Prenatal Vit-Fe Fumarate-FA (Prenatal/Folic Acid) TABS, Take 1 tablet by mouth daily, Disp: 30 tablet, Rfl: 11    Pyridoxine HCl (vitamin B-6) 25 MG tablet, , Disp: , Rfl:     simethicone (MYLICON) 80 mg chewable tablet, Chew 1 tablet (80 mg total) 4 (four) times a day as needed for flatulence (Patient not taking: Reported on 1/7/2025), Disp: 30 tablet, Rfl: 0    triamcinolone (KENALOG) 0.1 % cream, Apply topically 3 (three) times a day Apply sparingly to the rash on the nipple (Patient not taking: Reported on 3/11/2025), Disp: 15 g, Rfl: 1    Allergies   Allergen Reactions    Sulfa Antibiotics Hives       Objective   Vitals:    03/11/25 0945   BP: 116/74   BP Location: Left arm   Patient Position: Sitting   Cuff Size: Standard   Weight: 77.5 kg (170 lb 12.8 oz)     Physical Exam  Vitals and nursing note reviewed.   Constitutional:       General: She is not in acute distress.     Appearance: Normal appearance. She is not ill-appearing.   HENT:      Head: Normocephalic.   Neck:      Thyroid: No thyromegaly or thyroid tenderness.   Cardiovascular:      Rate and Rhythm: Normal rate  and regular rhythm.      Heart sounds: Normal heart sounds.   Pulmonary:      Effort: Pulmonary effort is normal. No respiratory distress.      Breath sounds: Normal breath sounds.   Chest:   Breasts:     Right: Normal. No inverted nipple, nipple discharge, skin change or tenderness.      Left: Normal. No inverted nipple, nipple discharge, skin change or tenderness.   Abdominal:      General: Abdomen is flat.      Palpations: Abdomen is soft.      Tenderness: There is no abdominal tenderness. There is no guarding.   Genitourinary:     General: Normal vulva.      Exam position: Lithotomy position.      Labia:         Right: No rash, tenderness or lesion.         Left: No rash, tenderness or lesion.       Urethra: No prolapse.      Vagina: Normal. No vaginal discharge, erythema, tenderness or lesions.      Cervix: Normal. No cervical motion tenderness, discharge or lesion.      Uterus: Not tender and no uterine prolapse.       Adnexa:         Right: No tenderness.          Left: No tenderness.     Musculoskeletal:      Cervical back: Neck supple.   Lymphadenopathy:      Cervical: No cervical adenopathy.   Skin:     General: Skin is warm and dry.      Capillary Refill: Capillary refill takes less than 2 seconds.   Neurological:      General: No focal deficit present.      Mental Status: She is alert and oriented to person, place, and time.   Psychiatric:         Mood and Affect: Mood normal.         Behavior: Behavior normal.         Thought Content: Thought content normal.         Makayla CHEN  OB/GYN  3/11/2025  10:13 AM

## 2025-03-11 ENCOUNTER — POSTPARTUM VISIT (OUTPATIENT)
Dept: OBGYN CLINIC | Facility: CLINIC | Age: 35
End: 2025-03-11

## 2025-03-11 VITALS — BODY MASS INDEX: 31.23 KG/M2 | DIASTOLIC BLOOD PRESSURE: 74 MMHG | SYSTOLIC BLOOD PRESSURE: 116 MMHG | WEIGHT: 170.8 LBS

## 2025-03-11 DIAGNOSIS — Z01.419 WELL WOMAN EXAM: Primary | ICD-10-CM

## 2025-04-10 DIAGNOSIS — N64.82 BREAST HYPOPLASIA: ICD-10-CM

## 2025-04-10 DIAGNOSIS — O92.4 HYPOGALACTIA: ICD-10-CM

## 2025-04-10 RX ORDER — METFORMIN HYDROCHLORIDE 500 MG/1
500 TABLET, EXTENDED RELEASE ORAL
Qty: 30 TABLET | Refills: 0 | Status: SHIPPED | OUTPATIENT
Start: 2025-04-10

## 2025-04-23 ENCOUNTER — APPOINTMENT (OUTPATIENT)
Dept: RADIOLOGY | Facility: CLINIC | Age: 35
End: 2025-04-23
Payer: COMMERCIAL

## 2025-04-23 ENCOUNTER — OFFICE VISIT (OUTPATIENT)
Dept: URGENT CARE | Facility: CLINIC | Age: 35
End: 2025-04-23
Payer: COMMERCIAL

## 2025-04-23 VITALS
SYSTOLIC BLOOD PRESSURE: 110 MMHG | HEART RATE: 74 BPM | RESPIRATION RATE: 16 BRPM | OXYGEN SATURATION: 99 % | DIASTOLIC BLOOD PRESSURE: 76 MMHG | TEMPERATURE: 98.3 F

## 2025-04-23 DIAGNOSIS — M79.671 RIGHT FOOT PAIN: Primary | ICD-10-CM

## 2025-04-23 PROCEDURE — 73630 X-RAY EXAM OF FOOT: CPT

## 2025-04-23 PROCEDURE — 99213 OFFICE O/P EST LOW 20 MIN: CPT

## 2025-04-23 NOTE — PROGRESS NOTES
[facility]        NAME: Savannah Morrison is a 34 y.o. female  : 1990    MRN: [id number]  DATE: 2025  TIME: 6:54 PM    Assessment and Plan   Right foot pain [M79.671]  1. Right foot pain  XR foot 3+ vw right    XR foot 3+ vw right    Ambulatory Referral to Orthopedic Surgery        Preliminary reading without acute osseous abnormality.  Orthopedic follow-up    Patient Instructions   The final xray result will appear in your mychart;   Ice as needed.  Acetaminophen for pain and inflammation.  Follow-up with orthopedics.  PCP follow-up in 3-5 days  Proceed to the ER if symptoms worsen.    If tests have been performed at [facility], our office will contact you with results if changes need to be made to the care plan discussed with you at the visit.  You can review your full results on [facility] MyChart.    Chief Complaint     Chief Complaint   Patient presents with    Foot Pain     Patient with R foot pain x4 days. Patient states she has broken this foot before. Patient states no direct injury to the foot. Patient states she would like an XR.          History of Present Illness       33 y/o F presents for atraumatic R foot pain x 4 days.  Patient was she was at yoga, when she inverted her foot and ankle.  Denies any audible cracking or popping sounds.  Has had continuous foot pain around balls of feet.  No use of Tylenol/Motrin or icing.        Review of Systems   Review of Systems   Musculoskeletal:  Positive for joint swelling. Negative for gait problem.         Current Medications       Current Outpatient Medications:     acetaminophen (TYLENOL) 325 mg tablet, Take 3 tablets (975 mg total) by mouth every 6 (six) hours as needed for mild pain for up to 3 doses, Disp: , Rfl:     clobetasol (TEMOVATE) 0.05 % external solution, Apply topically 2 (two) times a day For scalp psoriasis, Disp: 50 mL, Rfl: 6    clobetasol (TEMOVATE) 0.05 % ointment, Apply topically 2 (two) times a day For body psoriasis,  Disp: 60 g, Rfl: 5    docusate sodium (COLACE) 100 mg capsule, Take 1 capsule (100 mg total) by mouth 2 (two) times a day, Disp: 60 capsule, Rfl: 0    hydrocortisone 2.5 % ointment, Apply topically 2 (two) times a day For face psoriasis, Disp: 30 g, Rfl: 6    ibuprofen (MOTRIN) 600 mg tablet, Take 1 tablet (600 mg total) by mouth every 6 (six) hours, Disp: 30 tablet, Rfl: 0    metFORMIN (GLUCOPHAGE-XR) 500 mg 24 hr tablet, TAKE 1 TABLET BY MOUTH EVERY DAY WITH DINNER, Disp: 30 tablet, Rfl: 0    Omega-3 Fatty Acids (OMEGA 3 PO), Take by mouth, Disp: , Rfl:     ondansetron (ZOFRAN) 4 mg tablet, Take 1 tablet (4 mg total) by mouth every 8 (eight) hours as needed for nausea or vomiting (Patient not taking: Reported on 1/7/2025), Disp: 30 tablet, Rfl: 1    oxyCODONE (Roxicodone) 5 immediate release tablet, Take 1 tablet (5 mg total) by mouth every 4 (four) hours as needed for severe pain for up to 10 doses Max Daily Amount: 30 mg (Patient not taking: Reported on 1/7/2025), Disp: 10 tablet, Rfl: 0    Prenatal Vit-Fe Fumarate-FA (Prenatal/Folic Acid) TABS, Take 1 tablet by mouth daily, Disp: 30 tablet, Rfl: 11    Pyridoxine HCl (vitamin B-6) 25 MG tablet, , Disp: , Rfl:     simethicone (MYLICON) 80 mg chewable tablet, Chew 1 tablet (80 mg total) 4 (four) times a day as needed for flatulence (Patient not taking: Reported on 1/7/2025), Disp: 30 tablet, Rfl: 0    triamcinolone (KENALOG) 0.1 % cream, Apply topically 3 (three) times a day Apply sparingly to the rash on the nipple (Patient not taking: Reported on 3/11/2025), Disp: 15 g, Rfl: 1    Current Allergies     Allergies as of 04/23/2025 - Reviewed 04/23/2025   Allergen Reaction Noted    Sulfa antibiotics Hives 12/22/2023            The following portions of the patient's history were reviewed and updated as appropriate: allergies, current medications, past family history, past medical history, past social history, past surgical history and problem list.     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    Migraine 2012    Psoriasis     Psoriatic arthritis (HCC)     Varicella        Past Surgical History:   Procedure Laterality Date    AL  DELIVERY ONLY N/A 2024    Procedure:  SECTION ();  Surgeon: Lamar Urrutia MD;  Location: [facility] ;  Service: Obstetrics    TONSILECTOMY AND ADNOIDECTOMY      age 9       Family History   Problem Relation Age of Onset    Thyroid disease Mother     Depression Mother     Anxiety disorder Mother     Rashes / Skin problems Father         Psoriasis    Alcohol abuse Father         Passed away     Drug abuse Father     Ovarian cysts Sister     Breast cancer Maternal Grandmother     Hypertension Maternal Grandmother     Osteoporosis Maternal Grandmother     Hearing loss Maternal Grandmother     Cancer Maternal Grandfather     Heart attack Maternal Grandfather     Osteoarthritis Maternal Grandfather     Esophageal cancer Maternal Grandfather     Rheum arthritis Maternal Grandfather     Seizures Paternal Grandmother     Alcohol abuse Paternal Grandfather     Dementia Paternal Grandfather     Alcohol abuse Sister     Anxiety disorder Sister     Self-Injury Sister     Suicide Attempts Sister          Medications have been verified.        Objective   /76   Pulse 74   Temp 98.3 °F (36.8 °C)   Resp 16   SpO2 99%   No LMP recorded.       Physical Exam     Physical Exam  Vitals and nursing note reviewed.   Constitutional:       General: She is not in acute distress.     Appearance: She is not toxic-appearing.   HENT:      Head: Normocephalic and atraumatic.   Eyes:      Conjunctiva/sclera: Conjunctivae normal.   Pulmonary:      Effort: Pulmonary effort is normal.   Musculoskeletal:         General: No swelling, tenderness, deformity or signs of injury. Normal range of motion.      Right lower leg: No edema.       Comments: NVI  Pain with extension of the toes  Full range of motion of toes and ankle      Skin:     Findings: No bruising, erythema or rash.   Neurological:      Mental Status: She is alert.   Psychiatric:         Mood and Affect: Mood normal.         Behavior: Behavior normal.

## 2025-05-12 DIAGNOSIS — O92.4 HYPOGALACTIA: ICD-10-CM

## 2025-05-12 DIAGNOSIS — N64.82 BREAST HYPOPLASIA: ICD-10-CM

## 2025-05-12 RX ORDER — METFORMIN HYDROCHLORIDE 500 MG/1
500 TABLET, EXTENDED RELEASE ORAL
Qty: 30 TABLET | Refills: 0 | Status: SHIPPED | OUTPATIENT
Start: 2025-05-12

## 2025-05-15 ENCOUNTER — TELEPHONE (OUTPATIENT)
Age: 35
End: 2025-05-15

## 2025-05-15 NOTE — TELEPHONE ENCOUNTER
Patient called in to schedule a f/u appt with Rheumatology. Patient expressed to me her interest in seeing Mamie for her care. I advised patient that we only allow 1 provider switch so she would not be able to change providers again, I also advised her of the availability and demand of appts for this provider. Patient was okay with this and wished to proceed with the provider change.

## 2025-06-17 ENCOUNTER — APPOINTMENT (OUTPATIENT)
Dept: LAB | Facility: CLINIC | Age: 35
End: 2025-06-17
Payer: COMMERCIAL

## 2025-06-17 LAB
CRP SERPL QL: 12.6 MG/L
ERYTHROCYTE [SEDIMENTATION RATE] IN BLOOD: 46 MM/HOUR (ref 0–19)

## 2025-06-18 LAB
GAMMA INTERFERON BACKGROUND BLD IA-ACNC: 0.04 IU/ML
M TB IFN-G BLD-IMP: NEGATIVE
M TB IFN-G CD4+ BCKGRND COR BLD-ACNC: 0.01 IU/ML
M TB IFN-G CD4+ BCKGRND COR BLD-ACNC: 0.02 IU/ML
MITOGEN IGNF BCKGRD COR BLD-ACNC: 7.45 IU/ML

## 2025-06-19 ENCOUNTER — OFFICE VISIT (OUTPATIENT)
Age: 35
End: 2025-06-19
Payer: COMMERCIAL

## 2025-06-19 ENCOUNTER — TELEPHONE (OUTPATIENT)
Age: 35
End: 2025-06-19

## 2025-06-19 VITALS
RESPIRATION RATE: 18 BRPM | HEIGHT: 62 IN | TEMPERATURE: 98.6 F | SYSTOLIC BLOOD PRESSURE: 106 MMHG | BODY MASS INDEX: 33.21 KG/M2 | DIASTOLIC BLOOD PRESSURE: 68 MMHG | HEART RATE: 87 BPM | WEIGHT: 180.5 LBS | OXYGEN SATURATION: 97 %

## 2025-06-19 DIAGNOSIS — L40.50 PSORIATIC ARTHRITIS (HCC): Primary | ICD-10-CM

## 2025-06-19 DIAGNOSIS — Z79.899 ENCOUNTER FOR LONG-TERM (CURRENT) USE OF MEDICATIONS: ICD-10-CM

## 2025-06-19 DIAGNOSIS — M35.7 HYPERMOBILITY SYNDROME: ICD-10-CM

## 2025-06-19 PROCEDURE — 99214 OFFICE O/P EST MOD 30 MIN: CPT | Performed by: PHYSICIAN ASSISTANT

## 2025-06-19 RX ORDER — CERTOLIZUMAB PEGOL 400 MG
KIT SUBCUTANEOUS
Qty: 3 KIT | Refills: 6 | Status: SHIPPED | OUTPATIENT
Start: 2025-06-19

## 2025-06-19 RX ORDER — CERTOLIZUMAB PEGOL 400 MG
KIT SUBCUTANEOUS
Qty: 3 KIT | Refills: 6 | Status: SHIPPED | OUTPATIENT
Start: 2025-06-19 | End: 2025-06-19

## 2025-06-19 NOTE — PROGRESS NOTES
Name: Savannah Morrison      : 1990      MRN: [id number]  Encounter Provider: Mamie Perkins PA-C  Encounter Date: 2025   Encounter department: [facility] RHEUMATOLOG[facility]  :  Assessment & Plan  Psoriatic arthritis (HCC)  Her psoriatic arthritis symptoms have been more active lately.  We discussed treatment options in detail.  She is currently breast-feeding and would like to continue doing so.  Cimzia was discussed in detail given its safety in pregnancy and breast-feeding.  Risks, benefits, administration, and indications were discussed in detail.  Will submit for insurance authorization and plan to start once the medication is approved.  Will start with the starter dose of 400 mg at weeks 0, 2, and 4 followed by 200 mg every 2 weeks thereafter.  Labs ordered to monitor for medication side effects and toxicity.  She is up-to-date with her QuantiFERON gold testing.  Will plan to follow-up in 3 to 4 months or sooner if needed.    Orders:    CBC and differential; Future    Comprehensive metabolic panel; Future    Sedimentation rate, automated; Future    C-reactive protein; Future    certolizumab (Cimzia) 2 x 200 mg; Inject 400 mg SC at week 0, week 2, week 4, then 200 mg SC every 2 weeks thereafter    Hypermobility syndrome  She does have a history of hypermobility and her son is currently being worked up for bruising easily.  She is interested in looking into genetic testing for Tati-Danlos syndrome.  I have given her  the contact information for other health centers that could do the testing for her.       Encounter for long-term (current) use of medications    Orders:    CBC and differential; Future    Comprehensive metabolic panel; Future    Sedimentation rate, automated; Future    C-reactive protein; Future        History of Present Illness   Savannah Morrison is a 34 y.o. female.  She is here for follow-up of psoriatic arthritis.  She has previously been seen by [facility] rheumatology.  " She was started on Skyrizi in  however she discontinued this prior to getting pregnant.  She did well off of the medication however in the fall  while she was pregnant she began developing worsening psoriasis along with joint pain.  She delivered a healthy baby boy via  on 2024.  Over the last several months she has continued to have worsening psoriasis and joint pain.  She does have swelling in her right ring finger as well as in several of her toes on her right foot.  She is having more generalized stiffness.    Her son is currently being evaluated for Tati-Danlos syndrome as he has been bruising easily.  She does note that she has always been hypermobile and does bruise easily as well.    She had labs done on 2025.  ESR 46, CRP 12.6.  QuantiFERON gold negative.          Review of Systems   Constitutional:  Negative for chills, fatigue and fever.   HENT:  Negative for hearing loss, sore throat and tinnitus.    Eyes:  Negative for pain and visual disturbance.   Respiratory:  Negative for cough and shortness of breath.    Cardiovascular:  Negative for chest pain and palpitations.   Gastrointestinal:  Negative for abdominal pain, nausea and vomiting.   Genitourinary:  Negative for difficulty urinating.   Musculoskeletal:  Negative for arthralgias, back pain, gait problem, joint swelling, myalgias, neck pain and neck stiffness.   Skin:  Negative for rash.   Neurological:  Negative for dizziness, seizures, weakness, numbness and headaches.   Psychiatric/Behavioral:  Negative for confusion, decreased concentration and sleep disturbance.      Medications Ordered Prior to Encounter[1]      Objective   /68 (BP Location: Left arm, Patient Position: Sitting, Cuff Size: Adult)   Pulse 87   Temp 98.6 °F (37 °C) (Tympanic)   Resp 18   Ht 5' 2\" (1.575 m)   Wt 81.9 kg (180 lb 8 oz)   SpO2 97%   BMI 33.01 kg/m²      Physical Exam  Constitutional:       Appearance: Normal appearance. "     Cardiovascular:      Rate and Rhythm: Normal rate and regular rhythm.   Pulmonary:      Breath sounds: Normal breath sounds.     Musculoskeletal:         General: No swelling or tenderness.     Skin:     General: Skin is warm and dry.     Neurological:      General: No focal deficit present.      Mental Status: She is alert.           Dragon Dictation software was used to dictate this note. It may contain errors with dictating incorrect words/spelling. Please contact provider directly for any questions.         [1]   Current Outpatient Medications on File Prior to Visit   Medication Sig Dispense Refill    clobetasol (TEMOVATE) 0.05 % external solution Apply topically 2 (two) times a day For scalp psoriasis 50 mL 6    clobetasol (TEMOVATE) 0.05 % ointment Apply topically 2 (two) times a day For body psoriasis 60 g 5    hydrocortisone 2.5 % ointment Apply topically 2 (two) times a day For face psoriasis 30 g 6    metFORMIN (GLUCOPHAGE-XR) 500 mg 24 hr tablet TAKE 1 TABLET BY MOUTH EVERY DAY WITH DINNER 30 tablet 0    acetaminophen (TYLENOL) 325 mg tablet Take 3 tablets (975 mg total) by mouth every 6 (six) hours as needed for mild pain for up to 3 doses      docusate sodium (COLACE) 100 mg capsule Take 1 capsule (100 mg total) by mouth 2 (two) times a day 60 capsule 0    ibuprofen (MOTRIN) 600 mg tablet Take 1 tablet (600 mg total) by mouth every 6 (six) hours 30 tablet 0    Omega-3 Fatty Acids (OMEGA 3 PO) Take by mouth      ondansetron (ZOFRAN) 4 mg tablet Take 1 tablet (4 mg total) by mouth every 8 (eight) hours as needed for nausea or vomiting (Patient not taking: Reported on 1/7/2025) 30 tablet 1    oxyCODONE (Roxicodone) 5 immediate release tablet Take 1 tablet (5 mg total) by mouth every 4 (four) hours as needed for severe pain for up to 10 doses Max Daily Amount: 30 mg (Patient not taking: Reported on 1/7/2025) 10 tablet 0    Prenatal Vit-Fe Fumarate-FA (Prenatal/Folic Acid) TABS Take 1 tablet by mouth  daily 30 tablet 11    Pyridoxine HCl (vitamin B-6) 25 MG tablet  (Patient not taking: Reported on 1/7/2025)      simethicone (MYLICON) 80 mg chewable tablet Chew 1 tablet (80 mg total) 4 (four) times a day as needed for flatulence (Patient not taking: Reported on 1/7/2025) 30 tablet 0    triamcinolone (KENALOG) 0.1 % cream Apply topically 3 (three) times a day Apply sparingly to the rash on the nipple (Patient not taking: Reported on 3/11/2025) 15 g 1     No current facility-administered medications on file prior to visit.

## 2025-06-19 NOTE — ASSESSMENT & PLAN NOTE
Her psoriatic arthritis symptoms have been more active lately.  We discussed treatment options in detail.  She is currently breast-feeding and would like to continue doing so.  Cimzia was discussed in detail given its safety in pregnancy and breast-feeding.  Risks, benefits, administration, and indications were discussed in detail.  Will submit for insurance authorization and plan to start once the medication is approved.  Will start with the starter dose of 400 mg at weeks 0, 2, and 4 followed by 200 mg every 2 weeks thereafter.  Labs ordered to monitor for medication side effects and toxicity.  She is up-to-date with her QuantiFERON gold testing.  Will plan to follow-up in 3 to 4 months or sooner if needed.    Orders:    CBC and differential; Future    Comprehensive metabolic panel; Future    Sedimentation rate, automated; Future    C-reactive protein; Future    certolizumab (Cimzia) 2 x 200 mg; Inject 400 mg SC at week 0, week 2, week 4, then 200 mg SC every 2 weeks thereafter

## 2025-06-19 NOTE — ASSESSMENT & PLAN NOTE
She does have a history of hypermobility and her son is currently being worked up for bruising easily.  She is interested in looking into genetic testing for Tati-Danlos syndrome.  I have given her  the contact information for other health centers that could do the testing for her.

## 2025-06-19 NOTE — TELEPHONE ENCOUNTER
Diagnosis: Psoriatic arthritis (L40.50)    Medication/Dosage: Cimzia 400 mg SC at weeks 0, 2, and 4, then 200 mg SC every 2 weeks thereafter    Failed/Intolerant to or Contraindicated:   Topical steroids  Skyrizi    Screening:  Labs: Quantiferon gold negative 6/17/2025; hepatitis panel negative 6/17/2024      Site of Medication Administration: Home    Comments: Patient is currently breastfeeding.  Cimzia is the only biologic safe for use in pregnancy and breastfeeding. I can provide studies demonstrating this if needed if Cimzia is denied.     Mamie Perkins PA-C   NPI: [id number]  LIC: [id number]

## 2025-06-19 NOTE — PATIENT INSTRUCTIONS
[facility]- Division of Human Genetics   [phone]     [facility]   [phone]     [facility]   [phone]

## 2025-06-23 NOTE — TELEPHONE ENCOUNTER
PA for cimzia 200mg SUBMITTED to [employer]    via    [x]CMM-KEY: [id number]  []Surescripts-Case ID #    []Availity-Auth ID #  NDC #    []Faxed to plan   []Other website    []Phone call Case ID #      [x]PA sent as URGENT    All office notes, labs and other pertaining documents and studies sent. Clinical questions answered. Awaiting determination from insurance company.     Turnaround time for your insurance to make a decision on your Prior Authorization can take 7-21 business days.

## 2025-07-02 DIAGNOSIS — L40.50 PSORIATIC ARTHRITIS (HCC): ICD-10-CM

## 2025-07-02 RX ORDER — CERTOLIZUMAB PEGOL 400 MG
KIT SUBCUTANEOUS
Qty: 3 KIT | Refills: 6 | Status: SHIPPED | OUTPATIENT
Start: 2025-07-02

## 2025-07-05 DIAGNOSIS — O92.4 HYPOGALACTIA: ICD-10-CM

## 2025-07-05 DIAGNOSIS — N64.82 BREAST HYPOPLASIA: ICD-10-CM

## 2025-07-05 RX ORDER — METFORMIN HYDROCHLORIDE 500 MG/1
500 TABLET, EXTENDED RELEASE ORAL
Qty: 30 TABLET | Refills: 0 | Status: SHIPPED | OUTPATIENT
Start: 2025-07-05

## (undated) DEVICE — SUT MONOCRYL 4-0 PS-2 18 IN Y496G

## (undated) DEVICE — GLOVE INDICATOR PI UNDERGLOVE SZ 6.5 BLUE

## (undated) DEVICE — SUT MONOCRYL 0 CTX 36 IN Y398H

## (undated) DEVICE — MEDI-VAC YANKAUER SUCTION HANDLE W/STRAIGHT TIP & CONTROL VENT: Brand: CARDINAL HEALTH

## (undated) DEVICE — SUT PLAIN 2-0 CTX 27 IN 872H

## (undated) DEVICE — PACK C-SECTION PBDS

## (undated) DEVICE — Device

## (undated) DEVICE — TELFA NON-ADHERENT ABSORBENT DRESSING: Brand: TELFA

## (undated) DEVICE — SPONGE LAP 18 X 18 IN STRL RFD

## (undated) DEVICE — SUT MONOCRYL 2-0 CT-1 27 IN Y339H

## (undated) DEVICE — SKIN MARKER DUAL TIP WITH RULER CAP, FLEXIBLE RULER AND LABELS: Brand: DEVON

## (undated) DEVICE — ADHESIVE SKIN HIGH VISCOSITY EXOFIN 1ML

## (undated) DEVICE — SUT VICRYL 0 CT-1 36 IN J946H

## (undated) DEVICE — ABDOMINAL PAD: Brand: DERMACEA

## (undated) DEVICE — GLOVE PI ULTRA TOUCH SZ 6

## (undated) DEVICE — CHLORAPREP HI-LITE 26ML ORANGE

## (undated) DEVICE — SUT VICRYL 0 CTX 36 IN J978H

## (undated) DEVICE — GAUZE SPONGES,16 PLY: Brand: CURITY